# Patient Record
Sex: MALE | Race: WHITE | NOT HISPANIC OR LATINO | Employment: STUDENT | URBAN - METROPOLITAN AREA
[De-identification: names, ages, dates, MRNs, and addresses within clinical notes are randomized per-mention and may not be internally consistent; named-entity substitution may affect disease eponyms.]

---
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
## 2024-08-07 ENCOUNTER — DOCUMENTATION (OUTPATIENT)
Dept: PRIMARY CARE | Facility: CLINIC | Age: 19
End: 2024-08-07
Payer: COMMERCIAL

## 2024-08-07 NOTE — LETTER
August 7, 2024     Patient: Osmany Tapia   YOB: 2005   Date of Visit: 8/7/2024       To Whom It May Concern:    Osmany Tapia is a long-time patient in my family practice.  He is seen her for acute and chronic care.  He has a history of environmental allergies.  Please allow him to have an air conditioner in his dorm room to help with a more controlled environment.  THANK YOU!    If you have any questions or concerns, please don't hesitate to call.         Sincerely,         ACE Avila-CNP

## 2024-08-07 NOTE — PROGRESS NOTES
Mom at OV with brother. Pt is transferring to [employer] and needs a note to have an air conditioner in his dorm room due to allergies.

## 2025-05-20 ENCOUNTER — OFFICE VISIT (OUTPATIENT)
Dept: ORTHOPEDIC SURGERY | Facility: HOSPITAL | Age: 20
End: 2025-05-20
Payer: COMMERCIAL

## 2025-05-20 ENCOUNTER — HOSPITAL ENCOUNTER (OUTPATIENT)
Dept: RADIOLOGY | Facility: HOSPITAL | Age: 20
Discharge: HOME | End: 2025-05-20
Payer: COMMERCIAL

## 2025-05-20 DIAGNOSIS — S83.511A DISRUPTION OF ANTERIOR CRUCIATE LIGAMENT OF KNEE, RIGHT, INITIAL ENCOUNTER: Primary | ICD-10-CM

## 2025-05-20 DIAGNOSIS — M25.561 ACUTE PAIN OF RIGHT KNEE: ICD-10-CM

## 2025-05-20 PROCEDURE — 99214 OFFICE O/P EST MOD 30 MIN: CPT | Performed by: SPECIALIST/TECHNOLOGIST

## 2025-05-20 PROCEDURE — 73564 X-RAY EXAM KNEE 4 OR MORE: CPT | Mod: RIGHT SIDE | Performed by: RADIOLOGY

## 2025-05-20 PROCEDURE — 73564 X-RAY EXAM KNEE 4 OR MORE: CPT | Mod: RT

## 2025-05-20 PROCEDURE — 99204 OFFICE O/P NEW MOD 45 MIN: CPT | Performed by: SPECIALIST/TECHNOLOGIST

## 2025-05-20 RX ORDER — MELOXICAM 15 MG/1
15 TABLET ORAL DAILY
Qty: 14 TABLET | Refills: 0 | Status: SHIPPED | OUTPATIENT
Start: 2025-05-20 | End: 2025-06-03

## 2025-05-20 ASSESSMENT — PAIN - FUNCTIONAL ASSESSMENT: PAIN_FUNCTIONAL_ASSESSMENT: 0-10

## 2025-05-20 ASSESSMENT — PAIN SCALES - GENERAL: PAINLEVEL_OUTOF10: 5 - MODERATE PAIN

## 2025-05-20 NOTE — PROGRESS NOTES
"Chief Complaint: Pain of the Right Knee       HPI:  Osmany Tapia is a 19 y.o. male presenting, with his mother, to the orthopedic walk-in clinic with right knee pain.  He states he was playing in a men's soccer league on 5/18/2025 went up for a header and came down and landed funny.  He felt a \"crack\".  Today he reports an achy sensation throughout the entire knee.  Pain is worsened with knee flexion, getting in and out of the car and going downstairs.  He has not done anything for it since time of injury.  He denies prior right knee injuries.  Denies numbness or tingling into the right lower extremity.  Presents for treatment recommendations.    Objective     ROS:  Constitutional: No fever, no chills, not feeling tired, no recent weight gain and no recent weight loss  ENT: No nosebleeds  Cardiovascular: No chest pain  Respiratory: No shortness of breath and no cough  Gastrointestinal: No abdominal pain, no nausea, no diarrhea, and no vomiting  Musculoskeletal: Positive for right knee pain and swelling  Integumentary: No rashes and no skin lesions  Neurological: No headache  Psychiatric: No sleep disturbances no depression  Endocrine: No muscle weakness and no muscle cramps  Hematologic/lymphatic: No swelling glands and no tendency for easy bruising    Medical History[1]     Surgical History[2]     Social Connections: Not on file          Physical Exam:  General appearance: WN, WD male, in no acute distress  Skin: No rashes, lesions or wounds  Head: Normocephalic, no evidence of trauma  Eye: EOMI, conjunctiva clear, no discharge  ENT: Nares patent  Neck: No abnormal contour, tracheal midline  Chest/lungs: No respiratory distress, speaking in complete sentences  Musculoskeletal: Tenderness to palpation over the lateral joint line.  POSITIVE Lachman's.  Positive Doretha's.  Stable valgus and varus stress test at 0 and 30 degrees.  He is able to perform an isometric quadriceps contraction and straight leg raise " with minimal difficulties.  Knee flexion to 35 degrees secondary to pain.  Moderate effusion.  No decreased ROM, muscle wasting, rigidity    Neurological: A&O x3, no focal deficits, intact bilateral LE  Psych: normal affect, mood, appearance      Image Results:  X-rays taken on 5/20/2025 reviewed with the patient and his mother in the office and showed no acute fractures or dislocations.      Assessment/Plan   Encounter Diagnoses:  Acute pain of right knee    Orders Placed This Encounter    XR knee right 4+ views       The patient, his mother and I discussed his clinical presentation and physical exam findings consistent with a right knee anterior cruciate ligament disruption.  We discussed his conservative and surgical treatment options.  Ultimately, we agreed upon an MRI scan of the right knee to evaluate the integrity of the anterior cruciate ligament.    I feel the MRI is medically indicated and necessary based upon his mechanism of action (an acute landing with a valgus force and audible crack), positive physical exam findings (POSITIVE Lachman's, positive Doretha's), and decreased function of the right lower extremity.  Please have the MRI performed in the hospital setting so this can be easily viewed by the physician as this will be used for presurgical planning purposes.    In the meantime, we agreed upon initiating conservative treatment.  We agreed upon meloxicam 15 mg taken once daily with food for the next 14 days.  He was provided with a referral to physical therapy to focus on quadriceps, gluteus, core strength and stability, in office modalities, edema control, range of motion and home exercise program.  We will follow-up once the MRI is complete.  They are in agreement the plan.  Their questions are answered.    ** This office note was dictated using Dragon voice to text software and was not proofread for spelling or grammatical errors **         [1]   Past Medical History:  Diagnosis Date    Otitis  media, unspecified, left ear 11/16/2018    Acute left otitis media    Personal history of other (healed) physical injury and trauma 11/16/2018    History of trauma    Personal history of other infectious and parasitic diseases 01/23/2019    History of viral warts   [2] History reviewed. No pertinent surgical history.

## 2025-06-04 ENCOUNTER — HOSPITAL ENCOUNTER (OUTPATIENT)
Dept: RADIOLOGY | Facility: CLINIC | Age: 20
Discharge: HOME | End: 2025-06-04
Payer: COMMERCIAL

## 2025-06-04 ENCOUNTER — TELEPHONE (OUTPATIENT)
Dept: ORTHOPEDIC SURGERY | Facility: HOSPITAL | Age: 20
End: 2025-06-04
Payer: COMMERCIAL

## 2025-06-04 DIAGNOSIS — S83.511A DISRUPTION OF ANTERIOR CRUCIATE LIGAMENT OF KNEE, RIGHT, INITIAL ENCOUNTER: ICD-10-CM

## 2025-06-04 PROCEDURE — 73721 MRI JNT OF LWR EXTRE W/O DYE: CPT | Mod: RT

## 2025-06-04 NOTE — TELEPHONE ENCOUNTER
Spoke to the patient and his mother today regarding his MRI scan.  Unfortunately, as we suspected in the office, he did sustain an anterior cruciate ligament disruption and lateral meniscus tear.  He says that his knee feels a little bit better he is sleeping better but continues to have some difficulties.  We discussed the need for surgical intervention to her fix his anterior cruciate ligament and possibly repair his lateral meniscus versus doing a lateral meniscectomy.  Will have him see Dr. Geovani Hendricks for surgical consultation.  They are in agreement the plan.  Questions were answered.

## 2025-06-06 ENCOUNTER — OFFICE VISIT (OUTPATIENT)
Dept: ORTHOPEDIC SURGERY | Facility: HOSPITAL | Age: 20
End: 2025-06-06
Payer: COMMERCIAL

## 2025-06-06 VITALS — WEIGHT: 185 LBS | HEIGHT: 70 IN | BODY MASS INDEX: 26.48 KG/M2

## 2025-06-06 DIAGNOSIS — S83.511A DISRUPTION OF ANTERIOR CRUCIATE LIGAMENT OF KNEE, RIGHT, INITIAL ENCOUNTER: ICD-10-CM

## 2025-06-06 DIAGNOSIS — S83.271A COMPLEX TEAR OF LATERAL MENISCUS OF RIGHT KNEE, INITIAL ENCOUNTER: ICD-10-CM

## 2025-06-06 DIAGNOSIS — S83.231A COMPLEX TEAR OF MEDIAL MENISCUS OF RIGHT KNEE, INITIAL ENCOUNTER: ICD-10-CM

## 2025-06-06 PROCEDURE — 99214 OFFICE O/P EST MOD 30 MIN: CPT | Performed by: ORTHOPAEDIC SURGERY

## 2025-06-06 PROCEDURE — 3008F BODY MASS INDEX DOCD: CPT | Performed by: ORTHOPAEDIC SURGERY

## 2025-06-06 ASSESSMENT — PAIN SCALES - GENERAL: PAINLEVEL_OUTOF10: 8

## 2025-06-06 ASSESSMENT — PAIN - FUNCTIONAL ASSESSMENT: PAIN_FUNCTIONAL_ASSESSMENT: 0-10

## 2025-06-06 NOTE — LETTER
June 6, 2025     Patient: Osmany Tapia   YOB: 2005   Date of Visit: 6/6/2025       To Whom It May Concern:    It is my medical opinion that Osmany Tapia {Work release (duty restriction):02879}.    If you have any questions or concerns, please don't hesitate to call.         Sincerely,        Geovani Gaines MD    CC: No Recipients

## 2025-06-06 NOTE — PROGRESS NOTES
Chief Complaint  Right knee injury    History of Present Illness   Osmany Tapia is 19 y.o. male presenting for a new patient evaluation of an acute right knee injury while playing soccer.  He was initially seen by Luis in the injury clinic.  The injury occurred a little over a week ago. The patient experienced an acute shift in the knee with immediate pain, swelling and difficulty bearing weight. The knee continues to feel unstable.  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  Patient is accompanied by parents.  No prior injury to the knee in the past.   Contralateral knee is asymptomatic.   Desires to remain active in sports, specifically soccer  Physical therapy has not been initiated.  MRI has been performed.     Medical History[1]    Medication Documentation Review Audit       Reviewed by Geovani Gaines MD (Physician) on 25 at 1054      Medication Order Taking? Sig Documenting Provider Last Dose Status   EPINEPHrine 0.3 mg/0.3 mL injection syringe [id number]  Inject 0.3 mL (0.3 mg) as directed 1 time. Historical Provider, MD  Active   meloxicam (Mobic) 15 mg tablet [id number]  Take 1 tablet (15 mg) by mouth once daily for 14 days. Jluis Wills PA-C   [id number]                    RX Allergies[2]    Social History     Socioeconomic History    Marital status: Single     Spouse name: Not on file    Number of children: Not on file    Years of education: Not on file    Highest education level: Not on file   Occupational History    Not on file   Tobacco Use    Smoking status: Never    Smokeless tobacco: Not on file   Substance and Sexual Activity    Alcohol use: Not on file    Drug use: Not on file    Sexual activity: Not on file   Other Topics Concern    Not on file   Social History Narrative    Not on file     Social Drivers of Health     Financial Resource Strain: Not on file   Food Insecurity: Not on file   Transportation Needs: Not on file   Physical Activity: Not on file   Stress: Not  on file   Social Connections: Not on file   Intimate Partner Violence: Not on file   Housing Stability: Not on file       Surgical History[3]     Review of Systems   GENERAL: Negative for malaise, significant weight loss, fever  MUSCULOSKELETAL: See HPI  NEURO:  Negative for numbness / tingling      Physical Exam  19 y.o. year-old male antalgic in no acute distress. Well nourished. Normal affect. Alert and oriented x 3.   Gait: Normal Tandem. Neutral alignment. Able to perform single leg stance. No abnormalities of balance or coordination.  Skin: Intact over the bilateral upper and lower extremities. No erythema, ecchymosis, or temperature changes.  Negative bilateral popliteal lymphadenopathy.  Hips: Painless ROM in all planes bilaterally. No tenderness to palpation.    Right Knee:  ROM: 5-11 positive 0 degrees. Negative crepitus.  And anterior drawer positive with guarded effusion.  Good quadriceps contraction. Intact extensor mechanism. Patellar tendon non-tender.  Patella facets non-tender. Negative apprehension. Negative tilt.   Lachman stable. Anterior drawer stable. Pivot negative. Posterior drawer negative.  Stable medial collateral ligament. Stable lateral collateral ligament.   Positive medial joint line tenderness.  Positive Doretha's.  Positive lateral joint line tenderness.  Positive Doretha's.       Left Knee:  ROM: 0-140 degrees. Negative crepitus.  No effusion.  Good quadriceps contraction. Intact extensor mechanism. Patellar tendon non-tender.  Patella facets non-tender. Negative apprehension. Negative tilt.   Lachman stable. Anterior drawer stable. Pivot negative. Posterior drawer negative.  Stable medial collateral ligament. Stable lateral collateral ligament.   Negative medial joint line tenderness.  Negative Doretha's.  Negative lateral joint line tenderness.  Negative Doretha's.       Motor Strength: 5 out of 5 in the bilateral lower extremities.  Neuro: L4-S1 sensation intact grossly  bilaterally. No clonus. 2+ and symmetric Patella and Achilles bilateral reflexes.  Vascular: 2+ DP/PT pulses bilaterally. Bilateral lower extremity compartments supple.     Imaging  MRI of the right knee is consistent with complete rupture of the ACL with translational bone bruises and effusion.  Positive tearing of the medial and lateral meniscus.  Articular cartilage grossly maintained.  Mild MCL sprain.  Growth plates closed.     Assessment:    Right knee injury consistent with acute anterior cruciate ligament rupture, medial and lateral meniscus tears     Plan:  The patient and I reviewed the findings of an ACL rupture with meniscus pathology. There is continued knee instability and pain. Conservative treatment has failed to provide an acceptable clinical outcome. Based on the patients activity level, persistent instability, ACL laxity on exam, and MRI findings surgery is indicated. We discussed maximizing PT and motion before surgery. We reviewed logistics regarding school and return to soccer.    Risks of knee arthroscopy were discussed with the patient at length. These include but are not limited to: Cardiovascular compromise, anesthetic complications infection, bleeding, neurovascular injury, blood clots, persistent pain, and stiffness.  The postoperative course regarding the use of medications, crutches, brace, care for the incision, and physical therapy were also outlined. We discussed logistics with regards to driving, work/school, and appropriate activity restrictions in the early post operative period. In most cases it takes 9 months to 1 year to achieve maximum recovery. The patient voices understanding of these risks and postoperative course.    The meniscus will be evaluated at the time of arthroscopy. Arthroscopic partial meniscectomy versus repair may be indicated based on the status of meniscus stability. If a repair is performed, up to one month on crutches may be required. There is a small risk  of recurrent meniscus tearing or failure of the meniscus to fully heal. Early physical therapy will limit flexion to 90° for the first month if a repair is performed in order to protect it sufficiently.    Complications specific to ACL reconstruction surgery include: loss of terminal knee flexion or extension, recurrent ligament rupture, implant related complications, development of knee adhesions, potential for additional surgery on the knee in the future, progression of knee degenerative chondral changes, and rupture of the contralateral native ACL. A small percentage of patients report an inability to return to their pre injury level of athletics.  We discussed the risks of recurrent rupture and contralateral knee injury are higher in younger patients.     A post operative hinged ACL brace is prescribed by me for post operative stabilization of the leg until quadriceps muscle strength returns and for protection of the ligament reconstruction. Diagnosis is right anterior cruciate ligament tear.    We discussed ACL graft reconstruction options. After an informed discussion, the patient and I elected to utilize bone-patellar tendon-bone autograft. We discussed the good clinical results and strength of the graft with this option. There is a potential for anterior knee pain, patellar tendinitis, patella fracture and focal area of numbness around the incision.        [1]   Past Medical History:  Diagnosis Date    Otitis media, unspecified, left ear 11/16/2018    Acute left otitis media    Personal history of other (healed) physical injury and trauma 11/16/2018    History of trauma    Personal history of other infectious and parasitic diseases 01/23/2019    History of viral warts   [2]   Allergies  Allergen Reactions    Amoxicillin Unknown    Peanut Unknown   [3] History reviewed. No pertinent surgical history.

## 2025-06-12 ENCOUNTER — EVALUATION (OUTPATIENT)
Dept: PHYSICAL THERAPY | Facility: CLINIC | Age: 20
End: 2025-06-12
Payer: COMMERCIAL

## 2025-06-12 DIAGNOSIS — R29.898 WEAKNESS OF RIGHT LOWER EXTREMITY: ICD-10-CM

## 2025-06-12 DIAGNOSIS — S83.511D DISRUPTION OF ANTERIOR CRUCIATE LIGAMENT OF KNEE, RIGHT, SUBSEQUENT ENCOUNTER: ICD-10-CM

## 2025-06-12 DIAGNOSIS — S83.231D COMPLEX TEAR OF MEDIAL MENISCUS OF RIGHT KNEE, SUBSEQUENT ENCOUNTER: ICD-10-CM

## 2025-06-12 DIAGNOSIS — M25.661 DECREASED RANGE OF MOTION (ROM) OF RIGHT KNEE: Primary | ICD-10-CM

## 2025-06-12 PROCEDURE — 97110 THERAPEUTIC EXERCISES: CPT | Mod: GP

## 2025-06-12 PROCEDURE — 97161 PT EVAL LOW COMPLEX 20 MIN: CPT | Mod: GP

## 2025-06-12 ASSESSMENT — ENCOUNTER SYMPTOMS
DEPRESSION: 0
LOSS OF SENSATION IN FEET: 0
OCCASIONAL FEELINGS OF UNSTEADINESS: 0

## 2025-06-12 ASSESSMENT — PAIN - FUNCTIONAL ASSESSMENT: PAIN_FUNCTIONAL_ASSESSMENT: 0-10

## 2025-06-12 ASSESSMENT — PAIN SCALES - GENERAL: PAINLEVEL_OUTOF10: 5 - MODERATE PAIN

## 2025-06-12 NOTE — PROGRESS NOTES
Physical Therapy    Physical Therapy Evaluation    Patient Name: Osmany Tapia  MRN: [id number]  Today's Date: 6/12/2025  Name and date of birth 2005 were confirmed at the start of today's session.     Time Entry:  Time Calculation  Start Time: 0907  Stop Time: 1000  Time Calculation (min): 53 min  PT Evaluation Time Entry  PT Evaluation (Low) Time Entry: 23  PT Therapeutic Procedures Time Entry  Therapeutic Exercise Time Entry: 30                   Assessment  PT Assessment Results: Decreased strength, Decreased range of motion, Decreased mobility, Pain  Rehab Prognosis: Excellent  Assessment Comment: Osmany is 3 1/2 weeks post ACL tear during a soccer game and presents with expected pain, ROM , strength and functional deficits. He has been referred to complete prehab prior to his scheduled ACL reconstruction on 7/8/ 25.  He will be seen for up to 2 visits to focus on extensive HEP instruction/ monitoring to work to maximize ROM, mm function, gait mechanics.  We are going to limit preop visits due to only having 20 total PT visits to use for the calendar year and needing to save visits for the extensive post op rehab.    Plan  Treatment/Interventions: Neuromuscular re-education, Therapeutic exercises, Gait training, Education/ Instruction, Other (comment) (Focus on HEP for prehab to prepare for upcoming ACL reconstruction)  PT Plan: Skilled PT  PT Frequency: Follow-up visit only (1 additional visit prior to surgery)  Duration: 3 weeks  Onset Date: 05/18/25  Number of Treatments Authorized: no prior auth required;   20 visit hard max per calendar year  Rehab Potential: Excellent  Plan of Care Agreement: Patient    Current Problem  1. Decreased range of motion (ROM) of right knee  Referral to Physical Therapy    Follow Up In Physical Therapy      2. Disruption of anterior cruciate ligament of knee, right, subsequent encounter  Referral to Physical Therapy    Follow Up In Physical Therapy      3. Complex tear  of medial meniscus of right knee, subsequent encounter  Referral to Physical Therapy    Follow Up In Physical Therapy      4. Weakness of right lower extremity  Follow Up In Physical Therapy          Subjective   General:  General  Reason for Referral: R knee ACL tear; prehab for scheduled surgery on 7/8/25  Referred By: Dr Geovani Gaines  Past Medical History Relevant to Rehab: Reviewed in Epic and on Rehab intake form  General Comment: Osmany injured his right knee during a soccer game in a men's competitive league on May 18, 2025.  He has had an MRI which confirmed the ACL was torn and the lateral meniscus is torn.  Precautions:  Precautions  STEADI Fall Risk Score (The score of 4 or more indicates an increased risk of falling): 0  Medical Precautions:  (Asthma, Tree nut allergy)  Precautions Comment: none    Pain:  Pain Assessment: 0-10  0-10 (Numeric) Pain Score: 5 - Moderate pain (worse with some movement)  Pain Frequency: Constant/continuous  Pain Interventions:  (OTC meds currently prn, ice if needed)  Home Living:  Home Living Comment: Two story home, lives with parents, taking steps one at a time  Prior Function Per Pt/Caregiver Report:  Level of [place]:  (INdependent)  Vocational:  (College student, 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     Knee AROM  R knee flexion: (140°): 118 deg  L knee flexion: (140°): 140 deg  R knee extension: (0°): -4 deg  L knee extension: (0°): 0 deg    Passive right knee ext to 0 deg    Knee MMT     Lower Extremity Strength Right Left  5/5 throughout   Hip Flexors 3+  Slight ext lag    Hip Abductors 4+    Hip Extensors 4    Hip Adductors 4-    Quads 3+  Poor to fair quad set with reps    Hamstrings 4-    Gastroc/Soleus 4    Ankle Dorsiflexors 5    Ankle  invertors 5    Ankle Evertors 5                    Special Tests  Lachman’s: (Negative): positive right  Anterior Drawer: (Negative): positive right  Gait  Gait Comment: Ambulating FWB with limp on right; extension lag during swing/ heel strike and stance on right. Step to pattern on stairs.  Flexibility  R hamstrings: mod tight  R quads: mod tight    Outcome Measures:  Other Measures  Lower Extremity Funtional Score (LEFS): 38 (Presurgery)     OP EDUCATION:  Outpatient Education  Individual(s) Educated: Patient  Education Provided: Home Exercise Program, POC  Risk and Benefits Discussed with Patient/Caregiver/Other: yes  Patient/Caregiver Demonstrated Understanding: yes  Plan of Care Discussed and Agreed Upon: yes  Patient Response to Education: Patient/Caregiver Verbalized Understanding of Information, Patient/Caregiver Performed Return Demonstration of Exercises/Activities, Patient/Caregiver Asked Appropriate Questions  Education Comment: HEP handout issued; instructed on access to HEP through [employer]  Access Code: [email or web address]  URL: [email or web address]  Date: 06/12/2025  Prepared by: Zora Gabriel    Exercises  - Supine Heel Slide  - 2-3 x daily - 7 x weekly - 20 reps  - Supine Knee Extension Stretch on Towel Roll  - 2-3 x daily - 7 x weekly - 1 reps - 5 min hold  - Long Sitting Quad Set with Towel Roll Under Heel (Mirrored)  - 2-3 x daily - 7 x weekly - 20 reps - 5 sec hold  - Active Straight Leg Raise with Quad Set  - 2-3 x daily - 7 x weekly - 3 sets - 10 reps  - Long Sitting Hamstring Set (Mirrored)  - 2-3 x daily - 7 x weekly - 20 reps - 5 sec hold  - Sidelying Hip Abduction  - 2-3 x daily - 7 x weekly - 3 sets - 10 reps  - Prone Terminal Knee Extension  - 2-3 x daily - 7 x weekly - 3 sets - 10 reps  - Prone Hip Extension  - 2-3 x daily - 7 x weekly - 3 sets - 10 reps  - Prone Knee Flexion Extension AROM  - 2-3 x daily - 7 x weekly - 3 sets - 10 reps  - Seated Hip Adduction  Isometrics with Ball  - 2-3 x daily - 7 x weekly - 3 sets - 10 reps      Today's Treatment:  30 min there ex: (prehab ex)  Supine passive knee ext with heel propped x 3 min (more for home)  Supine Heel slides:   x 20  Quad sets:  20 x 5 sec  Hamstring sets: 15 x 5 sec  Supine hip flexion: 2 x 10  Sidelying hip abd:  3 x 10  Prone knee ext with pillow under thigh:  x 5 min  Prone Hamstring curls:  3 x 10  Prone hip ext:  2 x 10  Prone TKE  15 x 5 sec  HEP handout issued    Goals: (pre surgery)  Patient Goal:  To be ready for ACL surgery in 3 1/2 weeks; and eventually be able to return to playing soccer competitively.    Physical Therapy Goals: (to be met prior to scheduled surgery on 7/8/25)  Symptom Management: Will be compliant with use of ice, elevation, brace use to control for edema, pain, instability with daily activities.  2.. ROM: Right knee AROM improved to at least 0-130 to prepare for ACL surgery and improve ease of achieving full post op ROM.  3.. Strength: Right hip strength at least 4+/5;  completes 20-30 SLRs without ext lag; demonstrates good quad set for normalized gait mechanics and to improve mm function to prepare for post op rehab.   4.. Gait: Ambulating FWB without limp or ext lag, with normal heel/ toe pattern and using reciprocal gait pattern ascending and descending stairs.   5. HEP: Will be independent and compliant with appropriate HEP for carryover of PT to meet all goals prior to ACL reconstruction surgery on 7/8/25.

## 2025-06-18 ENCOUNTER — PREP FOR PROCEDURE (OUTPATIENT)
Dept: ORTHOPEDIC SURGERY | Facility: HOSPITAL | Age: 20
End: 2025-06-18
Payer: COMMERCIAL

## 2025-06-18 DIAGNOSIS — S83.511D COMPLETE TEAR OF ANTERIOR CRUCIATE LIGAMENT OF KNEE, RIGHT, SUBSEQUENT ENCOUNTER: ICD-10-CM

## 2025-06-18 DIAGNOSIS — S83.231D COMPLEX TEAR OF MEDIAL MENISCUS OF RIGHT KNEE, SUBSEQUENT ENCOUNTER: ICD-10-CM

## 2025-06-18 DIAGNOSIS — S83.271D COMPLEX TEAR OF LATERAL MENISCUS OF RIGHT KNEE, SUBSEQUENT ENCOUNTER: ICD-10-CM

## 2025-06-19 ENCOUNTER — CLINICAL SUPPORT (OUTPATIENT)
Dept: PREADMISSION TESTING | Facility: HOSPITAL | Age: 20
End: 2025-06-19
Payer: COMMERCIAL

## 2025-06-19 RX ORDER — IBUPROFEN 200 MG
200 TABLET ORAL EVERY 8 HOURS PRN
COMMUNITY

## 2025-06-19 ASSESSMENT — ENCOUNTER SYMPTOMS
SHORTNESS OF BREATH: 0
ARTHRALGIAS: 1
SINUS CONGESTION: 0
EYE PAIN: 0
COUGH: 0
DIFFICULTY URINATING: 0
DIARRHEA: 0
EYE DISCHARGE: 0
CONFUSION: 0
RHINORRHEA: 0
NECK PAIN: 0
VOMITING: 0
AGITATION: 0
CHILLS: 0
NERVOUS/ANXIOUS: 0
ABDOMINAL PAIN: 0
NECK SWELLING: 0
NAUSEA: 0
FEVER: 0
WOUND: 0

## 2025-06-19 NOTE — CPM/PAT NURSE NOTE
CPM/PAT Nurse Note      Name: Osmany Tapia (Osmany Tapia)  /Age: 10/9// y.o.       Medical History[1]    Surgical History[2]    Patient Sexual activity questions deferred to the physician.    Family History[3]    Allergies[4]    Prior to Admission medications    Medication Sig Start Date End Date Taking? Authorizing Provider   ibuprofen 200 mg tablet Take 1 tablet (200 mg) by mouth every 8 hours if needed for mild pain (1 - 3) or moderate pain (4 - 6).   Yes Historical Provider, MD   EPINEPHrine 0.3 mg/0.3 mL injection syringe Inject 0.3 mL (0.3 mg) as directed 1 time. 17   Historical Provider, MD MADRID ROS:   Constitutional:    no fever   no chills  Neuro/Psych:    no agitation   no confusion   not nervous/anxious  Eyes:    no discharge   no pain   no corrective lenses  Ears:    no ear pain   no ear discharge   no hearing aides  Nose:    no nasal discharge   no sinus congestion   no epistaxis  Mouth:   Throat:    no throat pain  Neck:    no neck pain   neck swelling  Cardio:    no chest pain  Respiratory:    no cough   no shortness of breath  Endocrine:   GI:    no abdominal pain   no diarrhea   no nausea   no vomiting  :    no difficulty urinating  Musculoskeletal:    arthralgias (right knee)  Hematologic:    no history of blood transfusion   no blood clots  Skin:   no sores/wound   no rash      DASI Risk Score    No data to display       Caprini DVT Assessment    No data to display       Modified Frailty Index    No data to display       [id number] Stroke Risk Points  Current as of just now        N/A 0 to 9 Points:      Last Change: N/A          The [id number] risk score (Lip MICHEAL, et al. 2009. © 2010 [employer]) quantifies the risk of stroke for a patient with atrial fibrillation. For patients without atrial fibrillation or under the age of 18 this score appears as N/A. Higher score values generally indicate higher risk of stroke.        This score is  not applicable to this patient. Components are not calculated.          Revised Cardiac Risk Index    No data to display       Apfel Simplified Score    No data to display       Risk Analysis Index Results This Encounter    No data found in the last 10 encounters.       Stop Bang Score      Flowsheet Row Clinical Support from 6/19/2025 in [facility]   Do you snore loudly? 0 filed at 06/19/2025 1307   Do you often feel tired or fatigued after your sleep? 0 filed at 06/19/2025 1307   Has anyone ever observed you stop breathing in your sleep? 0 filed at 06/19/2025 1307   Do you have or are you being treated for high blood pressure? 0 filed at 06/19/2025 1307   Recent BMI (Calculated) 26.5 filed at 06/19/2025 1307   Is BMI greater than 35 kg/m2? 0=No filed at 06/19/2025 1307   Age older than 50 years old? 0=No filed at 06/19/2025 1307   Gender - Male 1=Yes filed at 06/19/2025 1307          Prodigy: High Risk  Total Score: 8              Prodigy Gender Score          ARISCAT Score for Postoperative Pulmonary Complications    No data to display       Land Perioperative Risk for Myocardial Infarction or Cardiac Arrest (PRINCESS)    No data to display         Nurse Plan of Action:                [1]   Past Medical History:  Diagnosis Date    Asthma     childhood    Joint pain     Otitis media, unspecified, left ear 11/16/2018    Acute left otitis media    Personal history of other (healed) physical injury and trauma 11/16/2018    History of trauma    Personal history of other infectious and parasitic diseases 01/23/2019    History of viral warts   [2] History reviewed. No pertinent surgical history.  [3]   Family History  Problem Relation Name Age of Onset    Thyroid disease Father      Cancer Paternal Grandfather          stomach    Other (MALIGNANT NEOPLASM) Paternal Grandfather      Thyroid disease Paternal Grandfather     [4]   Allergies  Allergen Reactions    Amoxicillin Unknown    Peanut Itching and  Other     Eyelid swelling, back muscles tighten

## 2025-06-19 NOTE — PERIOPERATIVE NURSING NOTE
PAT PRE-OPERATIVE INSTRUCTIONS    [facility]  [address] [place] [address]  [phone]    Please let your surgeon know if:      You develop:  Open sores, shingles, burning or painful urination as these may increase your risk of an infection.   Fever=100.4 or greater   New or worsening cold or flu symptoms ( cough, shortness of breath, sore throat, respiratory distress, headache, fatigue, GI symptoms)   You no longer wish to have the surgery.   Any other personal circumstances change that may lead to the need to cancel or defer this surgery-such as being sick or getting admitted to any hospital within one week of your planned procedure.    Your contact details change, such as a change of address or phone number.    Starting now:     Please DO NOT drink alcohol or smoke for 24 hours before surgery. It is well known that quitting smoking can make a huge difference to your health and recovery from surgery. The longer you abstain from smoking, the better your chances of a healthy recovery. If you need help with quitting, call 1-800-QUIT-NOW to be connected to a trained counselor who will discuss the best methods to help you quit.     Before your surgery:    Please stop all supplements/ vitamins 7 days prior to surgery (or as directed by your surgeon).   Please stop taking NSAID pain medicine such as Advil, Ibuprofen, and Motrin 7 days before surgery.    If you develop any fever, cough, cold, rashes, cuts, scratches, scrapes, urinary symptoms or infection anywhere on your body (including teeth and gums) prior to surgery, please call your surgeon’s office as soon as possible. This may require treatment to reduce the chance of cancellation on the day of surgery.    The day before your surgery:   DIET- Do not eat any food after MIDNIGHT.   Get a good night’s rest.  Use the special soap for bathing if you have been instructed to use one.    Scheduled surgery times may change  and you will be notified if this occurs - please check your personal voicemail for any updates.     On the morning of surgery:   Wear comfortable, loose fitting clothes which open in the front.   Shower and please do not wear moisturizers, creams, lotions, deodorants, makeup or perfume.    Please bring with you to surgery:   Photo ID and insurance card   Current list of medicines and allergies   Pacemaker/ Defibrillator/Heart stent cards as well as remote controls for implanted devices    CPAP machine and mask    Slings/ splints/ crutches   A copy of your complete advanced directive/DHPOA.    Please do NOT bring with you to surgery:   All jewelry and valuables should be left at home.   Prosthetic devices such as contact lenses, glasses, hearing aids, dentures, eyelash extensions, hairpins and body piercings must be removed prior to going in to the surgical suite. If you have a case for these items, please bring it with you on surgery day.    *Patients under the age of 18: A responsible adult must be present and remaining in the building throughout the surgical visit.    After outpatient surgery:   A responsible adult MUST accompany you at the time of discharge and stay with you for 24 hours after your surgery. You may NOT drive yourself home after surgery.    Do not drive, operate machinery, make critical decisions or do activities that require co-ordination or balance until after a night’s sleep.   Do not drink alcoholic beverages for 24 hours.   Instructions for resuming your medications will be provided by your surgeon.    CALL YOUR DOCTOR AFTER SURGERY IF YOU HAVE:     Chills and/or a fever of 101° F or higher.    Redness, swelling, pus or drainage from your surgical wound or a bad smell from the wound.    Lightheadedness, fainting or confusion.    Persistent vomiting (throwing up) and are not able to eat or drink for 12 hours.    Three or more loose, watery bowel movements in 24 hours (diarrhea).   Difficulty  or pain while urinating( after non-urological surgery)    Pain and swelling in your legs, especially if it is only on one side.    Difficulty breathing or are breathing faster than normal.    Any new concerning symptoms.      Reviewed pre-op instructions with patient, states understanding and denies further questions at this time.      Take Care Osmany!

## 2025-06-26 ENCOUNTER — TREATMENT (OUTPATIENT)
Dept: PHYSICAL THERAPY | Facility: CLINIC | Age: 20
End: 2025-06-26
Payer: COMMERCIAL

## 2025-06-26 DIAGNOSIS — S83.511D DISRUPTION OF ANTERIOR CRUCIATE LIGAMENT OF KNEE, RIGHT, SUBSEQUENT ENCOUNTER: ICD-10-CM

## 2025-06-26 DIAGNOSIS — S83.231D COMPLEX TEAR OF MEDIAL MENISCUS OF RIGHT KNEE, SUBSEQUENT ENCOUNTER: ICD-10-CM

## 2025-06-26 DIAGNOSIS — R29.898 WEAKNESS OF RIGHT LOWER EXTREMITY: ICD-10-CM

## 2025-06-26 DIAGNOSIS — M25.661 DECREASED RANGE OF MOTION (ROM) OF RIGHT KNEE: ICD-10-CM

## 2025-06-26 PROCEDURE — 97110 THERAPEUTIC EXERCISES: CPT | Mod: GP

## 2025-06-26 ASSESSMENT — PAIN SCALES - GENERAL: PAINLEVEL_OUTOF10: 0 - NO PAIN

## 2025-06-26 ASSESSMENT — PAIN - FUNCTIONAL ASSESSMENT: PAIN_FUNCTIONAL_ASSESSMENT: 0-10

## 2025-06-26 NOTE — PROGRESS NOTES
Physical Therapy    Physical Therapy Treatment/ Pre op discharge    Patient Name: Osmany Tapia  MRN: [id number]  Today's Date: 6/26/2025    Time Entry:   Time Calculation  Start Time: 1200  Stop Time: 1240  Time Calculation (min): 40 min     PT Therapeutic Procedures Time Entry  Therapeutic Exercise Time Entry: 40                   Assessment:   Pre surgery goals have been met.  Osmany's right knee AROM, quad function, hip strength, gait mechanics are WNL.  He is independent with his HEP for continued strengthening and ROM maintenance until his scheduled ACL reconstruction surgery on 7/8/25. No additional pre op visits are scheduled or indicated.    Plan:   Osmany will undergo ACL reconstruction surgery on 7/8/25.  He will begin post op rehab shortly thereafter. He is deciding if he will continue here or go to [facility] and will get that scheduled.     Current Problem  1. Weakness of right lower extremity  Follow Up In Physical Therapy      2. Disruption of anterior cruciate ligament of knee, right, subsequent encounter  Follow Up In Physical Therapy      3. Complex tear of medial meniscus of right knee, subsequent encounter  Follow Up In Physical Therapy      4. Decreased range of motion (ROM) of right knee  Follow Up In Physical Therapy          General     General  General Comment: Doing well. Has been doing his HEP and has less pain, improved knee motion, improved strength. Is walking better and doing steps better at home.    Subjective    Precautions  Precautions  STEADI Fall Risk Score (The score of 4 or more indicates an increased risk of falling): 0  Precautions Comment: none    Pain  Pain Assessment  Pain Assessment: 0-10  0-10 (Numeric) Pain Score: 0 - No pain (with current daily activities)  Pain Type:  (subacute)  Pain Location: Knee  Pain Orientation: Right    Objective   Knee AROM  Right knee :   0-140 deg painfree    Knee MMT     Lower Extremity Strength Right  (Pre op eval) Right  preop  Follow  up visit   Hip Flexors 3+  Slight ext lag 4+  30 SLRs , No ext lag   Hip Abductors 4+ 5   Hip Extensors 4 4+   Hip Adductors 4- 4   Quads 3+  Poor to fair quad set with reps 4   Hamstrings 4- 4   Gastroc/Soleus 4 4+   Ankle Dorsiflexors 5    Ankle invertors 5    Ankle Evertors 5                 Gait:  Ambulating level surfaces without a limp or ext katya at this time.  Reciprocal pattern on stairs.       Patient Education:   Access Code: [email or web address]  URL: [email or web address]  Date: 06/26/2025  Prepared by: Zora Gabriel    Exercises  - Supine Bridge  - 1 x daily - 7 x weekly - 15 reps - 5 sec hold  - Hip Abduction with Resistance Loop  - 1 x daily - 7 x weekly - 1-2 sets - 20 reps  - Hip Extension with Resistance Loop  - 1 x daily - 7 x weekly - 1-2 sets - 20 reps  - Standing Diagonal Hip Extension and External Rotation (Mirrored)  - 1 x daily - 7 x weekly - 1-2 sets - 20 reps  - Side Stepping with Resistance at Ankles  - 1 x daily - 7 x weekly  - Forward Step Up with Counter Support (Mirrored)  - 1 x daily - 7 x weekly - 20 reps    Today's Treatment: 40 min  Measurements of ROM / strength for improvement since preop eval (see objective data):  Stationary Bike:  8 min (SHERPANDIPITYdyne)  Supine SLR flexion 2 x 15  Tband hip ex:  Blue band above knees x 20 ea:   Abduction R/L   Extension R/L   ER (right)  Sidestepping with band above knees:  6 x 20 ft  Forward step up: 8in x 20  Bridging: 15 x 5 sec  Updated preop HEP with above.     Goals: (pre surgery)- as of 6/26/25  Patient Goal:  To be ready for ACL surgery in 3 1/2 weeks; and eventually be able to return to playing soccer competitively.    Physical Therapy Goals: (to be met prior to scheduled surgery on 7/8/25)  Symptom Management: Will be compliant with use of ice, elevation, brace use to control for edema, pain, instability with daily activities.met  2.. ROM: Right knee AROM improved to at least 0-130 to prepare for ACL surgery and improve ease  of achieving full post op ROM.- met (0-140 deg currently  3.. Strength: Right hip strength at least 4+/5;  completes 20-30 SLRs without ext lag; demonstrates good quad set for normalized gait mechanics and to improve mm function to prepare for post op rehab. - met  4.. Gait: Ambulating FWB without limp or ext lag, with normal heel/ toe pattern and using reciprocal gait pattern ascending and descending stairs. - met  5. HEP: Will be independent and compliant with appropriate HEP for carryover of PT to meet all goals prior to ACL reconstruction surgery on 7/8/25. - met

## 2025-07-08 ENCOUNTER — ANESTHESIA (OUTPATIENT)
Dept: OPERATING ROOM | Facility: HOSPITAL | Age: 20
End: 2025-07-08
Payer: COMMERCIAL

## 2025-07-08 ENCOUNTER — ANESTHESIA EVENT (OUTPATIENT)
Dept: OPERATING ROOM | Facility: HOSPITAL | Age: 20
End: 2025-07-08
Payer: COMMERCIAL

## 2025-07-08 ENCOUNTER — HOSPITAL ENCOUNTER (OUTPATIENT)
Facility: HOSPITAL | Age: 20
Setting detail: OUTPATIENT SURGERY
Discharge: HOME | End: 2025-07-08
Attending: ORTHOPAEDIC SURGERY | Admitting: ORTHOPAEDIC SURGERY
Payer: COMMERCIAL

## 2025-07-08 VITALS
TEMPERATURE: 98.8 F | SYSTOLIC BLOOD PRESSURE: 137 MMHG | HEART RATE: 88 BPM | DIASTOLIC BLOOD PRESSURE: 84 MMHG | OXYGEN SATURATION: 99 % | WEIGHT: 191.8 LBS | HEIGHT: 70 IN | BODY MASS INDEX: 27.46 KG/M2 | RESPIRATION RATE: 15 BRPM

## 2025-07-08 DIAGNOSIS — S83.511D COMPLETE TEAR OF ANTERIOR CRUCIATE LIGAMENT OF KNEE, RIGHT, SUBSEQUENT ENCOUNTER: ICD-10-CM

## 2025-07-08 DIAGNOSIS — S83.231D COMPLEX TEAR OF MEDIAL MENISCUS OF RIGHT KNEE, SUBSEQUENT ENCOUNTER: ICD-10-CM

## 2025-07-08 DIAGNOSIS — S83.271D COMPLEX TEAR OF LATERAL MENISCUS OF RIGHT KNEE, SUBSEQUENT ENCOUNTER: Primary | ICD-10-CM

## 2025-07-08 PROCEDURE — 2500000004 HC RX 250 GENERAL PHARMACY W/ HCPCS (ALT 636 FOR OP/ED): Performed by: PHYSICIAN ASSISTANT

## 2025-07-08 PROCEDURE — 2780000003 HC OR 278 NO HCPCS: Performed by: ORTHOPAEDIC SURGERY

## 2025-07-08 PROCEDURE — 3600000009 HC OR TIME - EACH INCREMENTAL 1 MINUTE - PROCEDURE LEVEL FOUR: Performed by: ORTHOPAEDIC SURGERY

## 2025-07-08 PROCEDURE — 64999 UNLISTED PX NERVOUS SYSTEM: CPT | Performed by: ANESTHESIOLOGY

## 2025-07-08 PROCEDURE — 7100000009 HC PHASE TWO TIME - INITIAL BASE CHARGE: Performed by: ORTHOPAEDIC SURGERY

## 2025-07-08 PROCEDURE — 2500000004 HC RX 250 GENERAL PHARMACY W/ HCPCS (ALT 636 FOR OP/ED): Performed by: ANESTHESIOLOGY

## 2025-07-08 PROCEDURE — 3600000004 HC OR TIME - INITIAL BASE CHARGE - PROCEDURE LEVEL FOUR: Performed by: ORTHOPAEDIC SURGERY

## 2025-07-08 PROCEDURE — 7100000001 HC RECOVERY ROOM TIME - INITIAL BASE CHARGE: Performed by: ORTHOPAEDIC SURGERY

## 2025-07-08 PROCEDURE — 64473 LWR XTR FSCL PLN BLK UNI NJX: CPT | Mod: XP,RT

## 2025-07-08 PROCEDURE — 2500000005 HC RX 250 GENERAL PHARMACY W/O HCPCS: Performed by: ORTHOPAEDIC SURGERY

## 2025-07-08 PROCEDURE — A29888 PR KNEE SCOPE,AID ANT CRUCIATE REPAIR: Performed by: NURSE ANESTHETIST, CERTIFIED REGISTERED

## 2025-07-08 PROCEDURE — 2500000004 HC RX 250 GENERAL PHARMACY W/ HCPCS (ALT 636 FOR OP/ED): Performed by: NURSE ANESTHETIST, CERTIFIED REGISTERED

## 2025-07-08 PROCEDURE — A29888 PR KNEE SCOPE,AID ANT CRUCIATE REPAIR: Performed by: ANESTHESIOLOGY

## 2025-07-08 PROCEDURE — 7100000002 HC RECOVERY ROOM TIME - EACH INCREMENTAL 1 MINUTE: Performed by: ORTHOPAEDIC SURGERY

## 2025-07-08 PROCEDURE — C1713 ANCHOR/SCREW BN/BN,TIS/BN: HCPCS | Performed by: ORTHOPAEDIC SURGERY

## 2025-07-08 PROCEDURE — 7100000010 HC PHASE TWO TIME - EACH INCREMENTAL 1 MINUTE: Performed by: ORTHOPAEDIC SURGERY

## 2025-07-08 PROCEDURE — 64447 NJX AA&/STRD FEMORAL NRV IMG: CPT | Performed by: ANESTHESIOLOGY

## 2025-07-08 PROCEDURE — 2720000007 HC OR 272 NO HCPCS: Performed by: ORTHOPAEDIC SURGERY

## 2025-07-08 PROCEDURE — A4550 SURGICAL TRAYS: HCPCS | Performed by: ORTHOPAEDIC SURGERY

## 2025-07-08 PROCEDURE — 3700000002 HC GENERAL ANESTHESIA TIME - EACH INCREMENTAL 1 MINUTE: Performed by: ORTHOPAEDIC SURGERY

## 2025-07-08 PROCEDURE — 3700000001 HC GENERAL ANESTHESIA TIME - INITIAL BASE CHARGE: Performed by: ORTHOPAEDIC SURGERY

## 2025-07-08 PROCEDURE — 2500000004 HC RX 250 GENERAL PHARMACY W/ HCPCS (ALT 636 FOR OP/ED): Performed by: ORTHOPAEDIC SURGERY

## 2025-07-08 PROCEDURE — 29888 ARTHRS AID ACL RPR/AGMNTJ: CPT | Performed by: SPECIALIST/TECHNOLOGIST

## 2025-07-08 PROCEDURE — 29888 ARTHRS AID ACL RPR/AGMNTJ: CPT | Performed by: ORTHOPAEDIC SURGERY

## 2025-07-08 DEVICE — BIOSURE REGENSORB INTERFERENCE                                    SCREW 7 MM X 20MM
Type: IMPLANTABLE DEVICE | Site: KNEE | Status: FUNCTIONAL
Brand: BIOSURE

## 2025-07-08 DEVICE — IMPLANTABLE DEVICE: Type: IMPLANTABLE DEVICE | Site: KNEE | Status: FUNCTIONAL

## 2025-07-08 RX ORDER — ASPIRIN 81 MG/1
81 TABLET ORAL 2 TIMES DAILY
Qty: 28 TABLET | Refills: 0 | Status: SHIPPED | OUTPATIENT
Start: 2025-07-09 | End: 2025-07-23

## 2025-07-08 RX ORDER — ONDANSETRON HYDROCHLORIDE 2 MG/ML
4 INJECTION, SOLUTION INTRAVENOUS ONCE AS NEEDED
Status: DISCONTINUED | OUTPATIENT
Start: 2025-07-08 | End: 2025-07-08 | Stop reason: HOSPADM

## 2025-07-08 RX ORDER — CEFAZOLIN SODIUM 2 G/100ML
2 INJECTION, SOLUTION INTRAVENOUS ONCE
Status: COMPLETED | OUTPATIENT
Start: 2025-07-08 | End: 2025-07-08

## 2025-07-08 RX ORDER — ONDANSETRON 4 MG/1
4 TABLET, ORALLY DISINTEGRATING ORAL EVERY 8 HOURS PRN
Qty: 30 TABLET | Refills: 0 | Status: SHIPPED | OUTPATIENT
Start: 2025-07-08 | End: 2025-07-18

## 2025-07-08 RX ORDER — FENTANYL CITRATE 50 UG/ML
INJECTION, SOLUTION INTRAMUSCULAR; INTRAVENOUS AS NEEDED
Status: DISCONTINUED | OUTPATIENT
Start: 2025-07-08 | End: 2025-07-08

## 2025-07-08 RX ORDER — HYDRALAZINE HYDROCHLORIDE 20 MG/ML
5 INJECTION INTRAMUSCULAR; INTRAVENOUS EVERY 30 MIN PRN
Status: DISCONTINUED | OUTPATIENT
Start: 2025-07-08 | End: 2025-07-08 | Stop reason: HOSPADM

## 2025-07-08 RX ORDER — MIDAZOLAM HYDROCHLORIDE 1 MG/ML
INJECTION, SOLUTION INTRAMUSCULAR; INTRAVENOUS AS NEEDED
Status: DISCONTINUED | OUTPATIENT
Start: 2025-07-08 | End: 2025-07-08

## 2025-07-08 RX ORDER — FENTANYL CITRATE 50 UG/ML
50 INJECTION, SOLUTION INTRAMUSCULAR; INTRAVENOUS ONCE
Status: COMPLETED | OUTPATIENT
Start: 2025-07-08 | End: 2025-07-08

## 2025-07-08 RX ORDER — PROPOFOL 10 MG/ML
INJECTION, EMULSION INTRAVENOUS AS NEEDED
Status: DISCONTINUED | OUTPATIENT
Start: 2025-07-08 | End: 2025-07-08

## 2025-07-08 RX ORDER — DIPHENHYDRAMINE HYDROCHLORIDE 50 MG/ML
12.5 INJECTION, SOLUTION INTRAMUSCULAR; INTRAVENOUS ONCE AS NEEDED
Status: DISCONTINUED | OUTPATIENT
Start: 2025-07-08 | End: 2025-07-08 | Stop reason: HOSPADM

## 2025-07-08 RX ORDER — ROPIVACAINE HYDROCHLORIDE 5 MG/ML
INJECTION, SOLUTION EPIDURAL; INFILTRATION; PERINEURAL
Status: COMPLETED | OUTPATIENT
Start: 2025-07-08 | End: 2025-07-08

## 2025-07-08 RX ORDER — DOCUSATE SODIUM 100 MG/1
100 CAPSULE, LIQUID FILLED ORAL 2 TIMES DAILY
Qty: 30 CAPSULE | Refills: 0 | Status: SHIPPED | OUTPATIENT
Start: 2025-07-08 | End: 2025-07-23

## 2025-07-08 RX ORDER — LIDOCAINE HYDROCHLORIDE 10 MG/ML
INJECTION, SOLUTION EPIDURAL; INFILTRATION; INTRACAUDAL; PERINEURAL AS NEEDED
Status: DISCONTINUED | OUTPATIENT
Start: 2025-07-08 | End: 2025-07-08

## 2025-07-08 RX ORDER — ONDANSETRON HYDROCHLORIDE 2 MG/ML
INJECTION, SOLUTION INTRAVENOUS AS NEEDED
Status: DISCONTINUED | OUTPATIENT
Start: 2025-07-08 | End: 2025-07-08

## 2025-07-08 RX ORDER — OXYCODONE AND ACETAMINOPHEN 5; 325 MG/1; MG/1
1 TABLET ORAL EVERY 6 HOURS PRN
Qty: 20 TABLET | Refills: 0 | Status: SHIPPED | OUTPATIENT
Start: 2025-07-08 | End: 2025-07-13

## 2025-07-08 RX ORDER — ALBUTEROL SULFATE 0.83 MG/ML
2.5 SOLUTION RESPIRATORY (INHALATION) ONCE AS NEEDED
Status: DISCONTINUED | OUTPATIENT
Start: 2025-07-08 | End: 2025-07-08 | Stop reason: HOSPADM

## 2025-07-08 RX ORDER — OXYCODONE HYDROCHLORIDE 5 MG/1
5 TABLET ORAL EVERY 4 HOURS PRN
Status: DISCONTINUED | OUTPATIENT
Start: 2025-07-08 | End: 2025-07-08 | Stop reason: HOSPADM

## 2025-07-08 RX ORDER — SODIUM CHLORIDE, SODIUM LACTATE, POTASSIUM CHLORIDE, CALCIUM CHLORIDE 600; 310; 30; 20 MG/100ML; MG/100ML; MG/100ML; MG/100ML
100 INJECTION, SOLUTION INTRAVENOUS CONTINUOUS
Status: DISCONTINUED | OUTPATIENT
Start: 2025-07-08 | End: 2025-07-08 | Stop reason: HOSPADM

## 2025-07-08 RX ORDER — MEPERIDINE HYDROCHLORIDE 25 MG/ML
12.5 INJECTION INTRAMUSCULAR; INTRAVENOUS; SUBCUTANEOUS EVERY 10 MIN PRN
Status: DISCONTINUED | OUTPATIENT
Start: 2025-07-08 | End: 2025-07-08 | Stop reason: HOSPADM

## 2025-07-08 RX ORDER — MIDAZOLAM HYDROCHLORIDE 1 MG/ML
2 INJECTION, SOLUTION INTRAMUSCULAR; INTRAVENOUS ONCE
Status: COMPLETED | OUTPATIENT
Start: 2025-07-08 | End: 2025-07-08

## 2025-07-08 RX ORDER — KETOROLAC TROMETHAMINE 30 MG/ML
INJECTION, SOLUTION INTRAMUSCULAR; INTRAVENOUS AS NEEDED
Status: DISCONTINUED | OUTPATIENT
Start: 2025-07-08 | End: 2025-07-08

## 2025-07-08 RX ORDER — HYDROMORPHONE HYDROCHLORIDE 0.2 MG/ML
0.2 INJECTION INTRAMUSCULAR; INTRAVENOUS; SUBCUTANEOUS EVERY 5 MIN PRN
Status: DISCONTINUED | OUTPATIENT
Start: 2025-07-08 | End: 2025-07-08 | Stop reason: HOSPADM

## 2025-07-08 RX ADMIN — FENTANYL CITRATE 50 MCG: 50 INJECTION, SOLUTION INTRAMUSCULAR; INTRAVENOUS at 11:08

## 2025-07-08 RX ADMIN — CEFAZOLIN SODIUM 2 G: 2 INJECTION, SOLUTION INTRAVENOUS at 10:30

## 2025-07-08 RX ADMIN — ROPIVACAINE HYDROCHLORIDE 20 ML: 5 INJECTION, SOLUTION EPIDURAL; INFILTRATION; PERINEURAL at 10:12

## 2025-07-08 RX ADMIN — FENTANYL CITRATE 50 MCG: 50 INJECTION INTRAMUSCULAR; INTRAVENOUS at 10:08

## 2025-07-08 RX ADMIN — MIDAZOLAM 2 MG: 1 INJECTION INTRAMUSCULAR; INTRAVENOUS at 10:30

## 2025-07-08 RX ADMIN — HYDROMORPHONE HYDROCHLORIDE 0.5 MG: 1 INJECTION, SOLUTION INTRAMUSCULAR; INTRAVENOUS; SUBCUTANEOUS at 12:50

## 2025-07-08 RX ADMIN — DEXAMETHASONE SODIUM PHOSPHATE 5 MG: 4 INJECTION INTRA-ARTICULAR; INTRALESIONAL; INTRAMUSCULAR; INTRAVENOUS; SOFT TISSUE at 10:10

## 2025-07-08 RX ADMIN — FENTANYL CITRATE 50 MCG: 50 INJECTION, SOLUTION INTRAMUSCULAR; INTRAVENOUS at 10:34

## 2025-07-08 RX ADMIN — KETOROLAC TROMETHAMINE 30 MG: 30 INJECTION, SOLUTION INTRAMUSCULAR at 12:04

## 2025-07-08 RX ADMIN — MIDAZOLAM 2 MG: 1 INJECTION INTRAMUSCULAR; INTRAVENOUS at 10:08

## 2025-07-08 RX ADMIN — PROPOFOL 200 MG: 10 INJECTION, EMULSION INTRAVENOUS at 10:35

## 2025-07-08 RX ADMIN — ONDANSETRON HYDROCHLORIDE 4 MG: 2 SOLUTION INTRAMUSCULAR; INTRAVENOUS at 12:04

## 2025-07-08 RX ADMIN — SODIUM CHLORIDE, POTASSIUM CHLORIDE, SODIUM LACTATE AND CALCIUM CHLORIDE: 600; 310; 30; 20 INJECTION, SOLUTION INTRAVENOUS at 10:30

## 2025-07-08 RX ADMIN — LIDOCAINE HYDROCHLORIDE 5 ML: 10 INJECTION, SOLUTION EPIDURAL; INFILTRATION; INTRACAUDAL; PERINEURAL at 10:35

## 2025-07-08 RX ADMIN — ROPIVACAINE HYDROCHLORIDE 15 ML: 5 INJECTION, SOLUTION EPIDURAL; INFILTRATION; PERINEURAL at 10:10

## 2025-07-08 SDOH — HEALTH STABILITY: MENTAL HEALTH: CURRENT SMOKER: 0

## 2025-07-08 ASSESSMENT — PAIN SCALES - GENERAL
PAINLEVEL_OUTOF10: 0 - NO PAIN
PAIN_LEVEL: 2
PAINLEVEL_OUTOF10: 7
PAINLEVEL_OUTOF10: 0 - NO PAIN
PAINLEVEL_OUTOF10: 6
PAINLEVEL_OUTOF10: 4
PAINLEVEL_OUTOF10: 7

## 2025-07-08 ASSESSMENT — PAIN DESCRIPTION - DESCRIPTORS
DESCRIPTORS: ACHING
DESCRIPTORS: STABBING;ACHING
DESCRIPTORS: ACHING;STABBING
DESCRIPTORS: ACHING
DESCRIPTORS: ACHING
DESCRIPTORS: ACHING;SHARP
DESCRIPTORS: ACHING

## 2025-07-08 ASSESSMENT — PAIN - FUNCTIONAL ASSESSMENT
PAIN_FUNCTIONAL_ASSESSMENT: 0-10
PAIN_FUNCTIONAL_ASSESSMENT: FLACC (FACE, LEGS, ACTIVITY, CRY, CONSOLABILITY)
PAIN_FUNCTIONAL_ASSESSMENT: 0-10

## 2025-07-08 ASSESSMENT — PAIN DESCRIPTION - ORIENTATION: ORIENTATION: RIGHT

## 2025-07-08 ASSESSMENT — COLUMBIA-SUICIDE SEVERITY RATING SCALE - C-SSRS
2. HAVE YOU ACTUALLY HAD ANY THOUGHTS OF KILLING YOURSELF?: NO
6. HAVE YOU EVER DONE ANYTHING, STARTED TO DO ANYTHING, OR PREPARED TO DO ANYTHING TO END YOUR LIFE?: NO
1. IN THE PAST MONTH, HAVE YOU WISHED YOU WERE DEAD OR WISHED YOU COULD GO TO SLEEP AND NOT WAKE UP?: NO

## 2025-07-08 ASSESSMENT — PAIN DESCRIPTION - LOCATION: LOCATION: KNEE

## 2025-07-08 NOTE — OP NOTE
RIGHT Knee ACL Reconstruction Operative Note     Date: 2025  OR Location: [facility]    Name: Osmany Tapia, : 2005, Age: 19 y.o., MRN: [id number], Sex: male    Diagnosis  Pre-op diagnosis      * Right knee anterior cruciate ligament rupture Post-op diagnosis      * Right knee anterior cruciate ligament rupture     Procedures  Right knee arthroscopic ACL reconstruction with BTB autograft  Right knee diagnostic arthroscopy and examination under anesthesia    Surgeons   Geovani Gaines MD    Resident/Fellow/Other Assistant:  ISAAC Rosado    Procedure Summary  Anesthesia: Regional and LMA ASA: I  Anesthesia Staff: Anesthesiologist: Geovani Knowles MD  CRNA: ACE Duggan-CRNA  Estimated Blood Loss: 10 mL  Intra-op Medications:   Medication Name Total Dose   ceFAZolin in dextrose (iso-os) (Ancef) IVPB 2 g 2 g   fentaNYL PF (Sublimaze) injection 50 mcg 50 mcg   midazolam (Versed) injection 2 mg 2 mg          Anesthesia Record               Intraprocedure I/O Totals          Intake    ceFAZolin in dextrose (iso-os) (Ancef) IVPB 2 g 100.00 mL    Total Intake 100 mL          Specimen: No specimens collected     Staff:   Circulator: Jaclyn Garnica RN  Relief Circulator: Zach Vazquez RN  Relief Scrub: Kelly Ravi RN  Scrub Person: David Reese     Drains and/or Catheters: None    Tourniquet Times:   * Missing tourniquet times found for documented tourniquets in lo *     Implants:  Implants       Type Name Action Serial No.              Screw SCREW, BIOSURE REGENESORB, 6 X 20MM - [id number] Implanted      Screw SCREW, BIOSURE REGENESORB, 7  X 25MM - [id number] Implanted                     Findings: ACL tear, Intact meniscus    Indications: Osmany Tapia is a 19 y.o. male who suffered a right knee anterior cruciate ligament injury during sporting activities. The patient continues to report knee pain, instability and swelling. Physical examination and MRI confirmed findings. Knee  arthroscopy and reconstruction is indicated. Risks and benefits were discussed with the patient. After questions were answered consent was obtained to proceed. In the holding area of the right knee was signed as the appropriate operative site, and the patient was positively identified. We agreed upon the use of autograft tissue.    Procedure: In the operative suite the patient was placed supine on the table. An LMA was inserted by the anesthesiologist. A right thigh tourniquet was placed. The right lower extremity was then prepped and draped in the usual sterile fashion. A timeout was performed and 2 g Ancef were given intravenously prior to initiating the procedure.  Right knee examination under anesthesia was performed. Full range of motion. Stable medial collateral ligament. Stable lateral collateral ligament. Stable posterior drawer. Positive Lachman and anterior drawer. Positive pivot glide.  The procedure was initiated by harvesting the patellar tendon through a longitudinal incision over the anterior knee. Superficial bleeders were cauterized. The peritenon was then incised and protected for later repair. The central 10 mm of the patellar tendon was then harvested with attached 10 x 20 mm bone plugs harvested from the tibial tubercle and patella with an oscillating saw, respectively. The graft was harvested without incident and was of excellent quality.  On the back table, ISAAC Rosado prepared the anterior cruciate ligament graft. The graft was prepared with cylindrical bone plugs through its drill holes were placed for passing sutures. The graft was prepared without incident and was of excellent quality.  A standard 2 portal diagnostic arthroscopy technique was utilized. The camera was inserted into the joint in an atraumatic fashion.  The suprapatellar pouch and gutters were unremarkable and free of debris.  The patellofemoral articular cartilage was intact.  The medial compartment was then entered.  Medial femoral condyle articular cartilage was intact. The medial meniscus was probed and intact.  The lateral compartment was entered. Lateral femoral condyle articular cartilage was intact. The lateral meniscus was probed and intact.  There was a small vertical incomplete crack that was quite stable.  The notch was then entered. The PCL was intact. The anterior cruciate ligament was ruptured at its mid substance. The notch was slightly narrow.  The anterior cruciate ligament was then debrided back to its footprints. A 5.5 mm shaver was utilized to perform a notchplasty along with the use of a 70° arthroscope. This opened up the slightly stenotic notch and allowed for visualization of the back wall. Using a medial portal technique a flexible guidepin was placed in the center of the anterior cruciate ligament footprint. A flexible reamer was then used to drill a 10 x 25 mm tunnel leaving a 2 mm back wall. Care was taken to protect the medial femoral condyle with reaming. The tunnel was reamed without incident and was in an anatomic position. A passing suture was pulled through for later graft passage.  The tibial tunnel was then reamed over a guide pin placed in the center of the anterior cruciate ligament tibial footprint. A 10 mm cylindrical reamer was used to create the tunnel. The reamings were saved for later autogenous bone grafting. The tibial tunnel was in excellent position.  The knee was then lavaged and suctioned of debris.  Right knee arthroscopic anterior cruciate ligament reconstruction with bone-patellar tendon-bone autograft was then completed by passing the graft into the knee. The femoral bone plug docked nicely.  The femoral bone plug was secured with a 6 x 20 mm bio composite screw for an excellent interference fit.  The knee was then cycled and brought into extension. There was no evidence of notch impingement. Tension was held on the graft and the knee was brought again into extension.  The  tibial bone plug was secured with a 7 x 25 mm bio composite screw to complete graft fixation with a secure interference fit.  The anterior cruciate ligament graft was then probed and stable. The Lachman and pivot shift were performed and stable.  The knee was then lavaged and suctioned of debris. Instruments were removed and fluid expelled. Portals were closed with interrupted 3-0 nylon sutures. The central one third of the patellar tendon was then reapproximated with interrupted 0 Vicryl sutures. The patella and tibial harvest sites were bone grafted with autogenous bone graft. The peritenon was then closed over the top with interrupted 2-0 nylon sutures. A layered closure was performed in the skin followed by running subcuticular Monocryl and Steri-Strips.  All sponge counts and needle counts are correct. There were no complications. A Xeroform and sterile gauze dressing was applied followed by a bulky cotton web roll, Ace wrap and hinged knee brace locked in extension. A cryotherapy device is utilized. The patient was transported awake, extubated, and in stable condition to the recovery room without incident.  Postoperative DVT prophylaxis included aspirin, compressive stockings, active ankle pumps and early ambulation.  ISAAC Rosado was present for the entire case. His assistance was necessary and critical to the successful completion of the procedure. He provided skilled assistance with preparation of the anterior cruciate ligament graft, graft passage and skin closure. A qualified assistant was not available to perform his portion of the case.    Complications:  None; patient tolerated the procedure well.    Disposition: PACU - hemodynamically stable.  Condition: stable     Attending Attestation: I performed the procedure.    Geovani Gaines  Phone Number: [phone]

## 2025-07-08 NOTE — PERIOPERATIVE NURSING NOTE
Pt doing well in recovery. Pt tolerates po well.  Pain as tolerable at this time. Pt ready for phase. 2.

## 2025-07-08 NOTE — H&P
"History Of Present Illness  Osmany Tapia is a 19 y.o. male presenting with right knee pain occurring on 5/18/2025 while in a men soccer league he was landing from a header and felt a \"crack\".  He reports achy and unstable knee pain is worsened with knee flexion getting in and out of the car and going up and down stairs.  He denies prior right knee injuries.     Past Medical History  He has a past medical history of Asthma, Joint pain, Otitis media, unspecified, left ear (11/16/2018), Personal history of other (healed) physical injury and trauma (11/16/2018), and Personal history of other infectious and parasitic diseases (01/23/2019).    He has no past medical history of Refusal of blood product.    Surgical History  He has no past surgical history on file.     Social History  He reports that he has never smoked. He has never been exposed to tobacco smoke. He has never used smokeless tobacco. He reports that he does not currently use alcohol after a past usage of about 1.0 standard drink of alcohol per week. He reports that he does not use drugs.    Family History  Family History[1]     Allergies  Amoxicillin and Peanut    Review of Systems  Negative unless stated in the HPI  Physical Exam  19 y.o. year-old male antalgic in no acute distress. Well nourished. Normal affect. Alert and oriented x 3.   Gait: Normal Tandem. Neutral alignment. Able to perform single leg stance. No abnormalities of balance or coordination.  Skin: Intact over the bilateral upper and lower extremities. No erythema, ecchymosis, or temperature changes.  Negative bilateral popliteal lymphadenopathy.  Hips: Painless ROM in all planes bilaterally. No tenderness to palpation.     Right Knee:  ROM: 5-11 positive 0 degrees. Negative crepitus.  And anterior drawer positive with guarded effusion.  Good quadriceps contraction. Intact extensor mechanism. Patellar tendon non-tender.  Patella facets non-tender. Negative apprehension. Negative tilt. " "  Lachman stable. Anterior drawer stable. Pivot negative. Posterior drawer negative.  Stable medial collateral ligament. Stable lateral collateral ligament.   Positive medial joint line tenderness.  Positive Doretha's.  Positive lateral joint line tenderness.  Positive Doretha's.        Left Knee:  ROM: 0-140 degrees. Negative crepitus.  No effusion.  Good quadriceps contraction. Intact extensor mechanism. Patellar tendon non-tender.  Patella facets non-tender. Negative apprehension. Negative tilt.   Lachman stable. Anterior drawer stable. Pivot negative. Posterior drawer negative.  Stable medial collateral ligament. Stable lateral collateral ligament.   Negative medial joint line tenderness.  Negative Doretha's.  Negative lateral joint line tenderness.  Negative Doretha's.        Motor Strength: 5 out of 5 in the bilateral lower extremities.  Neuro: L4-S1 sensation intact grossly bilaterally. No clonus. 2+ and symmetric Patella and Achilles bilateral reflexes.  Vascular: 2+ DP/PT pulses bilaterally. Bilateral lower extremity compartments supple.     Imaging  MRI of the right knee is consistent with complete rupture of the ACL with translational bone bruises and effusion.  Positive tearing of the medial and lateral meniscus.  Articular cartilage grossly maintained.  Mild MCL sprain.  Growth plates closed.  Last Recorded Vitals  Blood pressure 141/73, pulse 82, temperature 36.9 °C (98.4 °F), temperature source Temporal, resp. rate 17, height 1.778 m (5' 10\"), weight 87 kg (191 lb 12.8 oz), SpO2 100%.    Relevant Results    Right knee MRI is consistent with acute anterior cruciate ligament rupture, medial and lateral meniscus tears   Scheduled medications  Scheduled Medications[2]  Continuous medications  Continuous Medications[3]  PRN medications  PRN Medications[4]  No results found for this or any previous visit (from the past 24 hours).    Assessment/Plan   Assessment & Plan  Complete tear of anterior cruciate " ligament of knee, right, subsequent encounter    Complex tear of medial meniscus of right knee, subsequent encounter    Complex tear of lateral meniscus of right knee, subsequent encounter      We had a discussion with the patient regarding his conservative and surgical treatment options.  He continues to have persistent instability and pain and desires to remain active.  He wishes to pursue a right knee arthroscopic anterior cruciate ligament reconstruction and meniscus repair versus debridement.  The risk and benefits of the procedure were discussed at length with the patient and he wishes to proceed.         Jluis Wills PA-C         [1]   Family History  Problem Relation Name Age of Onset    Thyroid disease Father      Cancer Paternal Grandfather          stomach    Other (MALIGNANT NEOPLASM) Paternal Grandfather      Thyroid disease Paternal Grandfather      Arthritis Mother Lashell Incorvati 50 - 59   [2] ceFAZolin, 2 g, intravenous, Once    [3] lactated Ringer's, 100 mL/hr    [4]

## 2025-07-08 NOTE — ANESTHESIA PROCEDURE NOTES
Peripheral Block    Patient location during procedure: pre-op  Medication administered at: 7/8/2025 10:12 AM  End time: 7/8/2025 10:14 AM  Reason for block: at surgeon's request and post-op pain management  Staffing  Performed: attending   Authorized by: Geovani Knowles MD    Performed by: Geovani Knowles MD  Preanesthetic Checklist  Completed: patient identified, IV checked, site marked, risks and benefits discussed, surgical consent, monitors and equipment checked, pre-op evaluation and timeout performed   Timeout performed at: 7/8/2025 10:09 AM  Peripheral Block  Patient position: laying flat  Prep: ChloraPrep  Patient monitoring: heart rate, cardiac monitor and continuous pulse ox  Block type: adductor canal  Laterality: right  Injection technique: single-shot  Guidance: ultrasound guided  Needle  Needle gauge: 21 G  Needle length: 10 cm  Needle localization: ultrasound guidance  Test dose: negative  Assessment  Injection assessment: negative aspiration for heme, no paresthesia on injection, incremental injection and local visualized surrounding nerve on ultrasound  Paresthesia pain: none  Heart rate change: no  Slow fractionated injection: yes  Additional Notes  Ropivicaine 20 ml 0.5 % with 5 mg Decadron  Medications Administered  dexAMETHasone (Decadron) injection - perineural injection   5 mg - 7/8/2025 10:12:00 AM  ropivacaine (NAROPIN) 5 MG/ML Perineural - perineural injection   20 mL - 7/8/2025 10:12:00 AM

## 2025-07-08 NOTE — DISCHARGE INSTRUCTIONS
Geovani Gaines M.D.  Department of Orthopedics  [address] [place] [address]  Office: [phone]    POST OPERATIVE INSTRUCTIONS FOR ACL RECONSTRUCTION    General Anesthesia or Sedation  You have been given medications that affect your balance and coordination for 24 hours.  Go directly home from the hospital and rest for the remainder of the day.  Have a responsible adult stay with you today and overnight.  Do not drive or operate equipment, conduct business or sign any legal documents for the next 24 hours or while taking pain medication.  Do not drink alcohol, take tranquilizers or sleeping pills for the next 24 hours or while taking pain medication.  Deep breathe and cough two times at least every 4 hours today and tomorrow while you are awake. This will help keep fevers away.   Use your CPAP or BiPAP machine whenever sleeping during the day or night.    Diet  Start a light meal and advance to your regular diet as tolerated    Dressing/Wound Care  Keep your dressing clean and dry until seen in the office or by physical therapy  You dressing will be removed at your first post op appointment with the surgical team or with physical therapy.  You may see some spotting or drainage on your dressing, this is normal.  The purpose of the dressing is to apply pressure to the incision and to soak up any discharge or drainage from the incision.  Once the dressing has been removed, inspect the incisions daily for and changes. Some bleeding or light draining may be on the dressing. Bruising around the incisions, the back of the knee and down the shin are normal and will fade away.     Showering/Bathing  Once the dressing has been removed you may shower. Please cover the incisions with water proof band aids or a plastic wrap. Incisions should stay dry until sutures have been removed.   Allow soap and water to gently run over the operative area and pat dry when covered until incisions are fully  healed    Activity and Exercise  Wear your immobilizer or brace until follow up appointment.  Your knee brace is set at a range of motion of 0 degrees. It will be adjusted once seen by the surgical team or physical therapy  Begin  Sports Medicine leg exercises (see instruction sheet) on post op day 1.  Your weight bearing status until your follow up appointment is:  Non-weight bearing - operative extremity may not bear any weight and you must use crutches at all times. Weight bearing status will be adjusted once seen by surgical team or physical therapy.    **Physical Therapy can start 3 - 4 days post op. Please schedule. Your physical order should be in the pre-surgery packet you were sent. If you do not have one, please contact the office.     Ice/Polar Care  Apply an ice pack every 2 hours for 20 - 30 minutes while awake for the first 2 - 3 days.  Then 3 - 5 times a day for 20 minutes until seen by your surgeon.  Never place an ice pack directly on skin.  Always have a barrier such as a towel between the ice pack and your skin.  Your Polar Care unit may be used continuously for the first 2 days postoperatively, then 3 - 5 times daily for 30 minutes until seen by your surgeon.  Refill with ice and water as needed.    Elevation  Elevating your operative extremity will lessen swelling and discomfort.    Support Hose  Wear the support hose sent home with you at all times if you were provided them.  Please take the additional hose with you to the Physical Therapist or Physician office to put on your surgical leg after the dressing is removed.  You may take the hose off to hand wash and air dry, do not place them in the washer or dryer.  You will need to wear the support hose until cleared by your physician.  Wearing compression stockings, using blood thinning medications (typically aspirin), and performing ankle pumps help prevent blood clots!    Medications  Pain medications should be taken with food.  You may  experience dizziness or drowsiness while taking your prescribed pain medication.   DO NOT DRIVE!  DO NOT DRINK ALCOHOL!  Pain medication can be constipating, drink plenty of fluids and increase your fiber intake to avoid this problem.  If you develop constipation, Colace is an over the counter stool softener you may use.  New Take Home Medications - Take as directed on bottle.    Resume your prescription medications as directed by your physician.    Do not take other medications containing Tylenol while on your pain medications.    When To Notify Your Physician  Persistent temperature greater than 101°F.  Increase or severe pain that does not respond to elevation, ice or pain medication.  Unexplained redness, swelling, numbness or tingling that does not improve with elevation or ice.  Increased amount or change in color of drainage.  Persistent nausea and vomiting.  Fingers and toes should remain pink and warm.  Notify your doctor if they become cool, grey or numb.  If you cannot reach your surgeon, seek care at an Urgent Care Center or Emergency Room.    For questions or concerns regarding your care please contact your surgeon      Dr. Geovani Gaines    [phone]   Jluis Wills PA-C    [phone]   After hours and weekends   [phone]    Post Operative Appointment:  Please call [facility] at [phone] to schedule your first appointment with Paolo Lake PA-C/Jluis Wills PA-C in 10-12 days if not already scheduled.    PLEASE NOTE:  Additional information and instruction will be provided by your physician regarding your surgical procedure and continued care at your initial post operative office appointment.                Heel Slide:   If brace is on wait on this. Sitting, pull foot towards body.    Assist stretch with hands. Hold for 5 seconds, then bend a   little bit farther and hold for another 5 seconds then relax.  Repeat 15 times, 6 times per day.           Heel Prop:  Whenever sitting, place  heel on a footrest. Heel must  be high enough so your calf and thigh are off the ground.      Towel/Cord Stretch-Toe pulls:       Sitting, wrap towel or cord around foot.  Pull   With one hand to raise foot.  Stabilize your leg  by placing your other hand on your thigh. Pull  on strap with thigh muscle relaxed for 5 seconds.  Then contract thigh muscle while releasing cord  and hold heel up for 5 seconds. Repeat  sequence 10 times, 6 times per day.      Quad Sets:   Tighten thigh muscle while pushing your knee   down into the towel.  Hold for 5 seconds then rest   for 5 seconds and repeat.  Perform 10 times, 6   times per day.    Straight Leg Raise:          Sit with back supported, or lay down. Tighten front thigh muscle, pull toe   back toward you, then lift leg 8-10   inches off surface. Pause at the top and   lower   slowly to start position. Repeat 15   times, 6 times per day. Sometimes this is easier a week after surgery.       ** Extension Habit ** When rising to stand, shift weight to involved leg and tighten thigh muscle to lock out the knee.  Hold for 10 seconds.    ** Don't forget ankle pumps throughout the day as well!!

## 2025-07-08 NOTE — ANESTHESIA PROCEDURE NOTES
Airway  Date/Time: 7/8/2025 10:37 AM  Reason: elective    Airway not difficult    Staffing  Performed: CRNA   Authorized by: Geovani Knowles MD    Performed by: BALA Duggan  Patient location during procedure: OR    Patient Condition  Indications for airway management: anesthesia  Patient position: sniffing  MILS maintained throughout  Sedation level: deep     Final Airway Details   Preoxygenated: yes  Final airway type: supraglottic airway  Successful airway:   Size: 4  Number of attempts at approach: 1

## 2025-07-08 NOTE — NURSING NOTE
Patient in Phase 2; dressed and up to chair with RN assist. Tolerating po fluids, minimal complaint of pain and no complaint of nausea.     RN got handoff report from VALENTIN RN in phase 2    Family at bedside; discussed discharge instructions with patient and Family. All questions at this time answered.     Discharge instructions provided using teachback method.  Patient's health-related risk factors discussed with patient.  Patient educated to look for worsening signs and symptoms and educated to seek medical attention if experiencing medical emergency.  Patient aware of needs to follow up with outpatient clinics as scheduled. Home going meds reviewed with patient.  Patient verbalized understanding of disposition and discharge instructions.  All questions answered to patient's satisfaction and within nursing scope of practice.    Patient clinically appropriate for discharge. Vitals stable/baseline.IV removed and patient transported to discharge area via wheelchair.

## 2025-07-08 NOTE — ANESTHESIA PROCEDURE NOTES
Peripheral Block    Patient location during procedure: pre-op  Medication administered at: 7/8/2025 10:10 AM  End time: 7/8/2025 10:12 AM  Reason for block: at surgeon's request and post-op pain management  Staffing  Performed: attending   Authorized by: Geovani Knowles MD    Performed by: Geovani Knowles MD  Preanesthetic Checklist  Completed: patient identified, IV checked, site marked, risks and benefits discussed, surgical consent, monitors and equipment checked, pre-op evaluation and timeout performed   Timeout performed at: 7/8/2025 10:09 AM  Peripheral Block  Patient position: laying flat  Prep: ChloraPrep  Patient monitoring: heart rate, cardiac monitor and continuous pulse ox  Block type: IPACK  Laterality: right  Injection technique: single-shot  Guidance: ultrasound guided  Needle  Needle gauge: 21 G  Needle length: 10 cm  Needle localization: ultrasound guidance  Test dose: negative  Assessment  Injection assessment: negative aspiration for heme, no paresthesia on injection, incremental injection and local visualized surrounding nerve on ultrasound  Paresthesia pain: none  Heart rate change: no  Slow fractionated injection: yes  Medications Administered  ropivacaine (NAROPIN) 5 MG/ML Perineural - perineural injection   15 mL - 7/8/2025 10:10:00 AM  dexAMETHasone (Decadron) injection - perineural injection   5 mg - 7/8/2025 10:10:00 AM

## 2025-07-08 NOTE — ANESTHESIA POSTPROCEDURE EVALUATION
Patient: Osmany Tapia    Procedure Summary       Date: 07/08/25 Room / Location: [facility] / [facility]    Anesthesia Start: 1030 Anesthesia Stop: 1235    Procedure: RIGHT Knee ACL Reconstruction with patella tendon autograft, medial & Lateral meniscus repair (LMA/FNB, S&N ACL set, meniscus Repair devices, suture anchors ) (Right: Knee) Diagnosis:       Complete tear of anterior cruciate ligament of knee, right, subsequent encounter      Complex tear of medial meniscus of right knee, subsequent encounter      Complex tear of lateral meniscus of right knee, subsequent encounter      (Complete tear of anterior cruciate ligament of knee, right, subsequent encounter [S83.511D])      (Complex tear of medial meniscus of right knee, subsequent encounter [S83.231D])      (Complex tear of lateral meniscus of right knee, subsequent encounter [S83.271D])    Surgeons: Geovani Gaines MD Responsible Provider: Geovani Knowles MD    Anesthesia Type: general ASA Status: 1            Anesthesia Type: general    Vitals Value Taken Time   /75 07/08/25 13:00   Temp 36.2 °C (97.2 °F) 07/08/25 12:30   Pulse 87 07/08/25 13:00   Resp 16 07/08/25 13:00   SpO2 97 % 07/08/25 13:00       Anesthesia Post Evaluation    Patient location during evaluation: PACU  Patient participation: complete - patient participated  Level of consciousness: awake  Pain score: 2  Pain management: adequate  Multimodal analgesia pain management approach  Airway patency: patent  Two or more strategies used to mitigate risk of obstructive sleep apnea  Cardiovascular status: acceptable  Respiratory status: acceptable  Hydration status: acceptable  Postoperative Nausea and Vomiting: none        There were no known notable events for this encounter.

## 2025-07-08 NOTE — BRIEF OP NOTE
Date: 2025  OR Location: [facility]    Name: Osmany Tapia, : 2005, Age: 19 y.o., MRN: [id number], Sex: male    Diagnosis  Pre-op Diagnosis      * Complete tear of anterior cruciate ligament of knee, right, subsequent encounter [S83.511D]     * Complex tear of medial meniscus of right knee, subsequent encounter [S83.231D]     * Complex tear of lateral meniscus of right knee, subsequent encounter [S83.271D] Post-op Diagnosis     * Right knee ACL tear     Procedures  Right knee arthroscopic anterior cruciate ligament reconstruction with patellar tendon autograft  Right knee diagnostic arthroscopy and examination under anesthesia    Surgeons      * Geovani Gaines - Primary    Resident/Fellow/Other Assistant:  Surgeons and Role:     * Jluis Wills PA-C - Resident - Assisting    Staff:   Circulator: Jaclyn Schroeder Person: David Yepez Scrub: Kelly Yepez Circulator: Zach    Anesthesia Staff: Anesthesiologist: Geovani Knowles MD  CRNA: CAE Duggan-CRNA    Procedure Summary  Anesthesia: General  ASA: I  Estimated Blood Loss: 10 mL  Intra-op Medications:   Administrations occurring from 1022 to 1307 on 25:   Medication Name Total Dose   EPINEPHrine (Adrenalin) 1 mg in sodium chloride 3,000 mL irrigation 1 mL   dexAMETHasone (Decadron) 4 mg/mL IV Syringe 2 mL 8 mg   dexmedeTOMIDine (Precedex) bolus from bag 18 mcg   fentaNYL (Sublimaze) injection 50 mcg/mL 100 mcg   ketorolac (Toradol) 30 mg 30 mg   lactated Ringer's infusion 327.08 mL   lidocaine PF (Xylocaine-MPF) local injection 1 % 5 mL   midazolam (Versed) injection 1 mg/mL 2 mg   ondansetron (Zofran) 2 mg/mL injection 4 mg   propofol (Diprivan) injection 10 mg/mL 200 mg   ceFAZolin (Ancef) 2 g in dextrose (iso)  mL 2 g          Anesthesia Record               Intraprocedure I/O Totals          Intake    Dexmedetomidine 0.00 mL    The total shown is the total volume documented since Anesthesia Start was filed.    Total Intake  0 mL          Specimen: No specimens collected     Findings: ACL tear, intact meniscus    Complications:  None; patient tolerated the procedure well.     Disposition: PACU - hemodynamically stable.  Condition: stable  Specimens Collected: No specimens collected  Attending Attestation: I performed the procedure.    Geovani Gaines  Phone Number: [phone]

## 2025-07-08 NOTE — ANESTHESIA PREPROCEDURE EVALUATION
Patient: Osmany Tapia    Procedure Information       Anesthesia Start Date/Time: 07/08/25 1030    Procedure: RIGHT Knee ACL Reconstruction with patella tendon autograft, medial & Lateral meniscus repair (LMA/FNB, S&N ACL set, meniscus Repair devices, suture anchors ) (Right: Knee) - LMA/FNB    Location: [facility] / [facility]    Surgeons: Geovani Gaines MD            Relevant Problems   Anesthesia (within normal limits)      Cardiac (within normal limits)      Pulmonary (within normal limits)      Neuro (within normal limits)      GI (within normal limits)      /Renal (within normal limits)      Liver (within normal limits)      Endocrine (within normal limits)      Hematology (within normal limits)      Musculoskeletal (within normal limits)      HEENT (within normal limits)      ID (within normal limits)      Skin (within normal limits)      GYN (within normal limits)       Clinical information reviewed:   Tobacco  Allergies  Meds   Med Hx  Surg Hx   Fam Hx  Soc Hx      Vitals:    07/08/25 1025   BP: 128/71   Pulse: 76   Resp: 12   Temp:    SpO2: 99%       Surgical History[1]  Medical History[2]  Current Medications[3]  Prior to Admission medications    Medication Sig Start Date End Date Taking? Authorizing Provider   ibuprofen 200 mg tablet Take 1 tablet (200 mg) by mouth every 8 hours if needed for mild pain (1 - 3) or moderate pain (4 - 6).   Yes Historical Provider, MD   aspirin 81 mg EC tablet Take 1 tablet (81 mg) by mouth 2 times a day for 14 days. Do not fill before July 9, 2025. 7/9/25 7/23/25  Jluis Wills PA-C   docusate sodium (Colace) 100 mg capsule Take 1 capsule (100 mg) by mouth 2 times a day for 15 days. 7/8/25 7/23/25  Jluis Wills PA-C   EPINEPHrine 0.3 mg/0.3 mL injection syringe Inject 0.3 mL (0.3 mg) as directed 1 time.  Patient not taking: Reported on 7/8/2025 1/6/17   Historical Provider, MD   ondansetron ODT (Zofran-ODT) 4 mg disintegrating tablet Dissolve 1  "tablet (4 mg) in the mouth every 8 hours if needed for nausea or vomiting for up to 10 days. 7/8/25 7/18/25  Jluis Wills PA-C   oxyCODONE-acetaminophen (Percocet) 5-325 mg tablet Take 1 tablet by mouth every 6 hours if needed for moderate pain (4 - 6) or severe pain (7 - 10) for up to 5 days. 7/8/25 7/13/25  Jluis Wills PA-C     RX Allergies[4]  Social History     Tobacco Use    Smoking status: Never     Passive exposure: Never    Smokeless tobacco: Never   Substance Use Topics    Alcohol use: Not Currently     Alcohol/week: 1.0 standard drink of alcohol     Types: 1 Standard drinks or equivalent per week         Chemistry    No results found for: \"NA\", \"K\", \"CL\", \"CO2\", \"BUN\", \"CREATININE\", \"GLU\" No results found for: \"CALCIUM\", \"ALKPHOS\", \"AST\", \"ALT\", \"BILITOT\"       No results found for: \"WBC\", \"HGB\", \"HCT\", \"PLT\"  No results found for: \"PROTIME\", \"PTT\", \"INR\"  No results found for this or any previous visit (from the past 4464 hours).      NPO Detail:  NPO/Void Status  Date of Last Liquid: 07/07/25  Time of Last Liquid: 2330  Date of Last Solid: 07/07/25  Time of Last Solid: 2300  Time of Last Void: 0815         Physical Exam    Airway  Mallampati: I  TM distance: >3 FB  Neck ROM: full  Mouth opening: 3 or more finger widths     Cardiovascular    Dental - normal exam     Pulmonary    Abdominal            Anesthesia Plan    History of general anesthesia?: yes  History of complications of general anesthesia?: no    ASA 1     general and regional     The patient is not a current smoker.  Patient was not previously instructed to abstain from smoking on day of procedure.  Patient did not smoke on day of procedure.  Education provided regarding risk of obstructive sleep apnea.  intravenous induction   Anesthetic plan and risks discussed with patient.    Plan discussed with CRNA and CAA.           [1] History reviewed. No pertinent surgical history.  [2]   Past Medical History:  Diagnosis Date    Asthma  "    childhood    Joint pain     Otitis media, unspecified, left ear 11/16/2018    Acute left otitis media    Personal history of other (healed) physical injury and trauma 11/16/2018    History of trauma    Personal history of other infectious and parasitic diseases 01/23/2019    History of viral warts   [3]   Current Facility-Administered Medications:     EPINEPHrine (Adrenalin) 1 mg in sodium chloride 3,000 mL irrigation, , , PRN, Geovani Gaines MD, 1 mL at 07/08/25 1059    lactated Ringer's infusion, 100 mL/hr, intravenous, Continuous, [place] PA-C, Last Rate: 125 mL/hr at 07/08/25 1030, New Bag at 07/08/25 1030    Facility-Administered Medications Ordered in Other Encounters:     dexmedeTOMIDine (Precedex) bolus from bag, , intravenous, PRN, Brooke L Grobelny, APRN-CRNA, 10 mcg at 07/08/25 1048    fentaNYL PF (Sublimaze) injection, , intravenous, PRN, Brooke L Grobelny, APRN-CRNA, 50 mcg at 07/08/25 1034    lidocaine PF (Xylocaine) 10 mg/mL (1 %) injection, , epidural, PRN, Brooke L Grobelny, APRN-CRNA, 5 mL at 07/08/25 1035    midazolam (Versed) injection, , intravenous, PRN, Brooke L Grobelny, APRN-CRNA, 2 mg at 07/08/25 1030    propofol (Diprivan) injection, , intravenous, PRN, Brooke L Grobelny, APRN-CRNA, 200 mg at 07/08/25 1035  [4]   Allergies  Allergen Reactions    Amoxicillin Unknown    Peanut Itching and Other     Eyelid swelling, back muscles tighten

## 2025-07-11 ENCOUNTER — EVALUATION (OUTPATIENT)
Dept: PHYSICAL THERAPY | Facility: CLINIC | Age: 20
End: 2025-07-11
Payer: COMMERCIAL

## 2025-07-11 DIAGNOSIS — R29.898 WEAKNESS OF RIGHT LOWER EXTREMITY: Primary | ICD-10-CM

## 2025-07-11 DIAGNOSIS — S83.511D COMPLETE TEAR OF ANTERIOR CRUCIATE LIGAMENT OF KNEE, RIGHT, SUBSEQUENT ENCOUNTER: ICD-10-CM

## 2025-07-11 DIAGNOSIS — R26.9 GAIT DIFFICULTY: ICD-10-CM

## 2025-07-11 DIAGNOSIS — M25.561 ACUTE POSTOPERATIVE PAIN OF RIGHT KNEE: ICD-10-CM

## 2025-07-11 DIAGNOSIS — M25.661 DECREASED ROM OF RIGHT KNEE: ICD-10-CM

## 2025-07-11 DIAGNOSIS — G89.18 ACUTE POSTOPERATIVE PAIN OF RIGHT KNEE: ICD-10-CM

## 2025-07-11 PROCEDURE — 97110 THERAPEUTIC EXERCISES: CPT | Mod: GP

## 2025-07-11 PROCEDURE — 97161 PT EVAL LOW COMPLEX 20 MIN: CPT | Mod: GP

## 2025-07-11 PROCEDURE — 97116 GAIT TRAINING THERAPY: CPT | Mod: GP

## 2025-07-11 ASSESSMENT — PAIN - FUNCTIONAL ASSESSMENT: PAIN_FUNCTIONAL_ASSESSMENT: 0-10

## 2025-07-11 ASSESSMENT — PAIN SCALES - GENERAL: PAINLEVEL_OUTOF10: 5 - MODERATE PAIN

## 2025-07-11 NOTE — PROGRESS NOTES
"Physical Therapy    Physical Therapy Evaluation    Patient Name: Osmany Tapia  MRN: [id number]  Today's Date: 7/11/2025  Name and date of birth 2005 were confirmed at the start of today's session.     Time Entry:    Time Calculation  Start Time: 0850  Stop Time: 0950  Time Calculation (min): 60 min  PT Evaluation Time Entry  PT Evaluation (Low) Time Entry: 30 (including dressing removal, brace adjustment)  PT Therapeutic Procedures Time Entry  Therapeutic Exercise Time Entry: 20  Gait Training Time Entry: 10     Assessment    PT Assessment Results: Decreased strength, Decreased range of motion, Decreased mobility, Decreased skin integrity, Orthopedic restrictions, Pain  Rehab Prognosis: Excellent  Assessment Comment: Osmany is 3 days s/p right ACL reconstruction with BPTB autograft and presents with expected deficits.  He will benefit from skilled outpatient PT to progress per post op guidelines to address his deficits, learn self management strategies to facilitate his return to his PLOF.    Plan  Treatment/Interventions: Blood flow restriction therapy, Cryotherapy, Education/ Instruction, Electrical stimulation, Gait training, Manual therapy, Neuromuscular re-education, Therapeutic activities, Therapeutic exercises  PT Plan: Skilled PT  PT Frequency: 2 times per week (may need to vary frequency as we progress due to limited visits through insurance)  Duration: 12 weeks, pending progress  Onset Date: 07/08/25 (date of surgery)  Number of Treatments Authorized: 18/20 visits remain ; 20 visit \"hard max\"  Rehab Potential: Excellent  Plan of Care Agreement: Patient, Parent    Current Problem  1. Weakness of right lower extremity        2. Complete tear of anterior cruciate ligament of knee, right, subsequent encounter  Referral to Physical Therapy      3. Acute postoperative pain of right knee  Referral to Physical Therapy      4. Decreased ROM of right knee  Referral to Physical Therapy      5. Gait " difficulty            Subjective   General:  General  Reason for Referral: s/p right ACL reconstruction using BPTB autograft 7/8/25  Referred By: Dr Geovani Gaines  Past Medical History Relevant to Rehab: Reviewed in Epic and on Rehab intake from from ACL prehab.  General Comment: Osmany is 3 days s/p ACL reconstruction.  He has been using the ice machine consistently and doing his basic HEP. He has been in the locked post op brace since Tuesday and using bilateral crutches. He reports pain, difficulty ambulating with the crutches. His mother is present and participated in today's session.  Precautions:  Precautions  LE Weight Bearing Status:  (50% to WBAT per post op guidelines)  Post-Surgical Precautions:  (per post op guidelines for brace, crutches, WBING status)  Braces Applied: Post op brace in place and locked in extension until guidelines are met to allow for unlocking  Precautions Comment: Per post guidelines  Pain:  Pain Assessment: 0-10  0-10 (Numeric) Pain Score: 5 - Moderate pain (8 with attempts to move the knee)  Pain Type: Surgical pain  Pain Location: Knee  Pain Orientation: Right  Home Living:  Home Living Comment: Two story home, bedroom is upstairs, but sleeping on first floor currently.  Rail on left or right side going up stairs.    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  Some fresh blood still seeping; new gauze  replaced.   Knee swelling present.  Ace wrap and knee high compression sock in place (removed for dressing change and to assess knee); replaced after assessed.     Knee Palpation/Joint Mobility  Knee swelling (3+ Brush Test) and calf swelling present.    Knee AROM   Unable to perform AAROM- max guarding and pain  Knee PROM  R knee flexion: (140°): 30 deg  R knee extension: (0°): 0 deg    Right LE strength/ MM activation:  Fair quad set    Unable to complete SLR independent; mod to max assist to complete  Able to complete ankle pumps independently, gluteal sets    Left LE strength:  WNL throughout    Gait  At start of session:   NWB with bilateral axillary crutches; poor sequencing and keeping right leg outside of crutches on arrival  Hop to left for in/out of house (2 steps)  Improved gait pattern on level surfaces after gait education/ training today and after adjusting cruth hand rests.     Outcome Measures:  Other Measures  Lower Extremity Funtional Score (LEFS): 3     OP EDUCATION:    Outpatient Education  Individual(s) Educated: Patient, Parent  Education Provided: Home Exercise Program, POC, Post-Op Precautions, Other, Signs/Symptoms of Infection (Gait with crutches and 50% wbing status)  Risk and Benefits Discussed with Patient/Caregiver/Other: yes  Patient/Caregiver Demonstrated Understanding: yes  Plan of Care Discussed and Agreed Upon: yes  Patient Response to Education: Patient/Caregiver Verbalized Understanding of Information, Patient/Caregiver Performed Return Demonstration of Exercises/Activities, Patient/Caregiver Asked Appropriate Questions  Education Comment: reviewed and performed HEP , patient has post op handouts    Today's Treatment:  (Dressing removed prior to eval/ treatment)  Brace readjusted.  Educated on how to unlock/ lock knee brace to perform seated heel slides with assist, and passive knee flexion.    Therapeutic Ex:  Ankle pumps x 20  Calf stretch with strap 5 x 20 sec  Quad sets with tactile cueing:  15 x 5 sec  Gluteal sets:  10 x 5 sec  PROM for knee flexion in supine:  heel slide x 10 reps (to 30 deg, with pain and mm guarding  Supine SLR flexion with mod to max assist x 10 reps (mother instructed on how to assist at  home)  Instructed on passive knee flexion starting over towel roll, working toward over foot ball x 2 min at a time   AA knee flexion seated in unlocked brace x 10 reps (therapist assisted)  Reviewed HEP and use of ice machine/ ice packs.    Gait training:  Hand rest height adjusted up 1 notch.  Reviewed 50% wbing status until next week  Reviewed and practiced step to pattern with crutches for level surfaces.  Reviewed sequencing for getting in/out of house (stepping up to left leg first, leading with right leg coming down).  Suggested scooting up/down full flight of stairs this weekend if needing to go upstairs; otherwise, to continue use of main floor only this weekend).    Next visit:  Consider use of NMES for quad facilitation; work P/AA ROM, gait mechanics with crutches on level surfaces and practice stairs, education on progressing wbing as tolerated.     Goals:  Patient Goal:  To return to PLOF; eventually return to playing soccer.   STGs to be met in 6 weeks:  Pain: Will be independent with symptom management strategies and report right knee pain no worse than 2/10 with walking, stairs, ADLs, and appropriate post op exercises.  ROM: Right knee AROM at least 0-125 deg for normal gait mechanics, reciprocal stair climbing.  Edema: Minimal knee effusion (1+ stroke test or less) for good quad function and knee ROM.  Strength: Good quad facilitation, completing at least 10 SLRs without ext lag, controlling for knee valgus with 4in lat step down; good control on 8in step up, and quad strength at least 60% of uninvolved side for normalized gait and readiness to progress to more advanced phase of rehab.  Gait: Non antalgic gait on level surface, FWB, no assistive device, reciprocal stair ascending for improved home and community mobility.  Function: Will score at least 40/80 on LEFS; report independent function with all self care, ADLs, IADLs, and return to driving to commute to school.    LTGs to be met in 12+  weeks:   Pain: Right knee pain 0/10 with all daily activities and exercise progression.  ROM: Right knee AROM 0-140 for return to full PLOF.  Edema: Trace to no effusion for normal quad function and knee AROM.; no reactive swelling after ex.  Strength: Right LE MMT 5/5 throughout; ascending and descending 8 in step and 6-8 in lat step down with good quad control/ control for knee valgus; Quad strength 70-80% of left with HH dynamomter testing to initiate running program and progress to Phase 5 post op activities.  Gait: Non antalgic; reciprocal stair climbing, tolerating Alter G progression by 12 weeks post op.  Function: LEFS at least 65/80, tolerating running progression in Alter G  and low level plyometrics without rebound swelling or pain, ready to transition to advanced rehab independently as insurance visits may be exhausted. (Can explore available options for continued rehab as needed).

## 2025-07-12 PROBLEM — R26.9 GAIT DIFFICULTY: Status: ACTIVE | Noted: 2025-07-12

## 2025-07-14 ENCOUNTER — OFFICE VISIT (OUTPATIENT)
Dept: ORTHOPEDIC SURGERY | Facility: HOSPITAL | Age: 20
End: 2025-07-14
Payer: COMMERCIAL

## 2025-07-14 ENCOUNTER — TELEPHONE (OUTPATIENT)
Dept: ORTHOPEDIC SURGERY | Facility: HOSPITAL | Age: 20
End: 2025-07-14

## 2025-07-14 ENCOUNTER — TREATMENT (OUTPATIENT)
Dept: PHYSICAL THERAPY | Facility: CLINIC | Age: 20
End: 2025-07-14
Payer: COMMERCIAL

## 2025-07-14 DIAGNOSIS — M25.661 DECREASED RANGE OF MOTION (ROM) OF RIGHT KNEE: ICD-10-CM

## 2025-07-14 DIAGNOSIS — S83.231D COMPLEX TEAR OF MEDIAL MENISCUS OF RIGHT KNEE, SUBSEQUENT ENCOUNTER: ICD-10-CM

## 2025-07-14 DIAGNOSIS — R29.898 WEAKNESS OF RIGHT LOWER EXTREMITY: ICD-10-CM

## 2025-07-14 DIAGNOSIS — S83.511D DISRUPTION OF ANTERIOR CRUCIATE LIGAMENT OF RIGHT KNEE, SUBSEQUENT ENCOUNTER: Primary | ICD-10-CM

## 2025-07-14 DIAGNOSIS — S83.511D DISRUPTION OF ANTERIOR CRUCIATE LIGAMENT OF KNEE, RIGHT, SUBSEQUENT ENCOUNTER: ICD-10-CM

## 2025-07-14 PROCEDURE — 1036F TOBACCO NON-USER: CPT | Performed by: SPECIALIST/TECHNOLOGIST

## 2025-07-14 PROCEDURE — 97014 ELECTRIC STIMULATION THERAPY: CPT | Mod: GP

## 2025-07-14 PROCEDURE — 97110 THERAPEUTIC EXERCISES: CPT | Mod: GP

## 2025-07-14 PROCEDURE — 97116 GAIT TRAINING THERAPY: CPT | Mod: GP

## 2025-07-14 PROCEDURE — 99024 POSTOP FOLLOW-UP VISIT: CPT | Performed by: SPECIALIST/TECHNOLOGIST

## 2025-07-14 PROCEDURE — 99212 OFFICE O/P EST SF 10 MIN: CPT | Performed by: SPECIALIST/TECHNOLOGIST

## 2025-07-14 RX ORDER — IBUPROFEN 800 MG/1
800 TABLET, FILM COATED ORAL 3 TIMES DAILY
Qty: 42 TABLET | Refills: 0 | Status: SHIPPED | OUTPATIENT
Start: 2025-07-14 | End: 2025-07-28

## 2025-07-14 ASSESSMENT — PAIN SCALES - GENERAL: PAINLEVEL_OUTOF10: 5 - MODERATE PAIN

## 2025-07-14 ASSESSMENT — PAIN - FUNCTIONAL ASSESSMENT: PAIN_FUNCTIONAL_ASSESSMENT: 0-10

## 2025-07-14 NOTE — PROGRESS NOTES
Physical Therapy    Physical Therapy Treatment    Patient Name: Osmany Tapia  MRN: [id number]  Today's Date: 7/14/2025    Time Entry:   Time Calculation  Start Time: 1020  Stop Time: 1130  Time Calculation (min): 70 min     PT Therapeutic Procedures Time Entry  Therapeutic Exercise Time Entry: 35  Gait Training Time Entry: 15  PT Modalities Time Entry  E-Stim (Unattended) Time Entry: 15                Assessment:   Osmany is still in a lot of pain.  Exhibits fair quad set today, unable to complete an independent SLR in or out of brace. Mod edema, increased erythema noted.  Improved weight shiftinggait pattern after practice today  Plan:   Continue to progress per post op guidelines.  Work on quad function, ROM (knee ext and flexion per guidelines), progress wbing with crutches.  Monitor and update HEP.   Practice step to pattern on stairs to Osmany can access the upstairs of his home safely.    Current Problem  1. Disruption of anterior cruciate ligament of knee, right, subsequent encounter  Follow Up In Physical Therapy      2. Complex tear of medial meniscus of right knee, subsequent encounter  Follow Up In Physical Therapy      3. Decreased range of motion (ROM) of right knee  Follow Up In Physical Therapy      4. Weakness of right lower extremity  Follow Up In Physical Therapy          General     General  General Comment: Knee is very painful. Having a hard time getting around at home. Has been doing his HEP, and icing regularly.  Is out of pain meds, mother will call the Dr office to get more.    Subjective    Precautions  Precautions  Precautions Comment: Per post guidelines    Pain  Pain Assessment  Pain Assessment: 0-10  0-10 (Numeric) Pain Score: 5 - Moderate pain (10/10 this am)  Pain Type: Surgical pain  Pain Location: Knee  Pain Orientation: Right    Objective      Steri strips still in place; dried blood present; no fresh blood or seepage noted.  Compression sock in place. (Replaced with clean one  today in PT)    Significant erythema of posterior knee, proximal calf, distal posterior thigh.  Blood blister at inf/ med portion of incision- mother took a picture and with send to Dr attention via My Chart.    Distal thigh swelling is decreased slightly.    Ambulating PWB, right leg with decreased step length, leaning heavily on crutches.  Knee brace locked in ext.   Slightly improved gait pattern, improved sequencing and step length after practice/ education in clinic today.      Fair quad set with NMES assist  Achieved 35 deg AA/PROM knee flexion    Today's Treatment:  s/p right knee ACL recon with BPTB autograft, 7/8/25  EXERCISES Date 7/14/25 Date Date Date    Visit 2 (4/20) Visit Visit Visit    Post op week/ day 6 days             NMES for quad facilitation: 15 min  10 sec on/ 30 sec off  In supine             Ankle pumps X 20      Calf stretch with strap 5 x 20 sec      Gluteal sets 10 x 5 sec             Supine heel slides PT assisted x 15      Supine SLR flexion:  Out of brace  In locked brace   X 10 max A  X 10 max      Sidelying hip abduction X 15 min A, min cues             Seated heel slides in unlocked brace X 15 with PT assist                                                                                                 Gait training:  Weightshifting  Sequencing with crutches, in locked brace and practice 15 min total  Lateral/ A/P x 5 min  10 min in department, work to increase step length, wbing, work on improved technique                    Patient Education Reviewed elevation above level of hear, with ice and getting up frequently during the day.      HEP Reviewed            Goals:  Patient Goal:  To return to PLOF; eventually return to playing soccer.   STGs to be met in 6 weeks:  Pain: Will be independent with symptom management strategies and report right knee pain no worse than 2/10 with walking, stairs, ADLs, and appropriate post op exercises.  ROM: Right knee AROM at least 0-125 deg for  normal gait mechanics, reciprocal stair climbing.  Edema: Minimal knee effusion (1+ stroke test or less) for good quad function and knee ROM.  Strength: Good quad facilitation, completing at least 10 SLRs without ext lag, controlling for knee valgus with 4in lat step down; good control on 8in step up, and quad strength at least 60% of uninvolved side for normalized gait and readiness to progress to more advanced phase of rehab.  Gait: Non antalgic gait on level surface, FWB, no assistive device, reciprocal stair ascending for improved home and community mobility.  Function: Will score at least 40/80 on LEFS; report independent function with all self care, ADLs, IADLs, and return to driving to commute to school.    LTGs to be met in 12+ weeks:   Pain: Right knee pain 0/10 with all daily activities and exercise progression.  ROM: Right knee AROM 0-140 for return to full PLOF.  Edema: Trace to no effusion for normal quad function and knee AROM.; no reactive swelling after ex.  Strength: Right LE MMT 5/5 throughout; ascending and descending 8 in step and 6-8 in lat step down with good quad control/ control for knee valgus; Quad strength 70-80% of left with HH dynamomter testing to initiate running program and progress to Phase 5 post op activities.  Gait: Non antalgic; reciprocal stair climbing, tolerating Alter G progression by 12 weeks post op.  Function: LEFS at least 65/80, tolerating running progression in Alter G  and low level plyometrics without rebound swelling or pain, ready to transition to advanced rehab independently as insurance visits may be exhausted. (Can explore available options for continued rehab as needed).

## 2025-07-14 NOTE — PROGRESS NOTES
Subjective    Patient ID: Osmany Tapia is a 19 y.o. male.    Procedure: Right knee ACL reconstruction with patellar tendon autograft  Date of surgery: 7/8/2025    HPI:  Osmany Tapia is a 19 y.o. male presenting today, with his mother, 1 week status post right  anterior cruciate ligament reconstruction with patellar tendon autograft.  Overall, he is doing okay.  He does note a little increased pain and swelling.  He presents today for concern of a blood blister over the distal medial aspect of his incision.  Patient reports no adverse events.  Narcotic medication is complete.  He is using aspirin and SMOOTH hose for DVT prophylaxis and are compliant with use of the brace and weightbearing restrictions. Fever, chills, shortness of breath, or cough are denied. Physical therapy has started without difficulty.  They present for continued treatment recommendations.     ROS:  Constitutional: No fever, no chills, not feeling tired, no recent weight gain and no recent weight loss  ENT: No nosebleeds  Cardiovascular: No chest pain  Respiratory: No shortness of breath and no cough  Gastrointestinal: No abdominal pain, no nausea, no diarrhea, and no vomiting  Musculoskeletal: No arthralgias  Integumentary: No rashes and no skin lesions  Neurological: No headache  Psychiatric: No sleep disturbances no depression  Endocrine: No muscle weakness and no muscle cramps  Hematologic/lymphatic: No swelling glands and no tendency for easy bruising    Medical History[1]     Surgical History[2]     Current Medications[3]     RX Allergies[4]     Social Connections: Not on file        PHYSICAL EXAM:  19 y.o. male well appearing in no acute distress. Alert and oriented ×3.  Skin intact bilateral lower extremities.   Using crutches and brace. Coordination and balance intact.  Bilateral lower extremity compartments supple.  5 out of 5 distal motor strength bilaterally.  L4 through S1 sensation intact bilaterally.  2+ DP/PT pulses  bilaterally.  Right knee incisions are clean, dry and intact without signs of infection.  Moderate sized blood blister over the distal medial aspect of his tibial incision with multiple smaller ones diffusely over the medial and lateral aspect of the tibial incision. There is mild quad atrophy.  Large effusion.    ASSESSMENT/PLAN:  Status post 1 week right knee anterior cruciate ligament reconstruction with Lopez tendon autograft.     1.  Overall, he is doing okay.  We removed the postoperative Steri-Strips and upon removal, the distal medial blood blister opened.  This was extensively cleaned.  He was instructed on dressing changes twice daily.  He was provided with Xeroform, gauze, paper tape to change the dressings.  2. Continue frequent icing and elevation.  He should ice 20-30 minutes 2-3 times per day.  We agreed upon 800 mg of ibuprofen taken 3 times daily in conjunction with two 500 mg extra strength Tylenol 3 times a day as well.  3. Continue outpatient physical therapy and home exercise program, per the protocol.  Emphasize placed on quadriceps strength and stability, edema and pain control.  4. Appropriate activity and restrictions were discussed.  I highly encouraged him to elevate his foot and lower leg is much as he can.  I encouraged him to perform gentle quadriceps contractions and ankle range of motion exercises.  5. Follow up again in 1 week with Paolo Lake PA-C for suture removal.  They are in agreement the plan.  Their questions are answered.    **This office note was dictated using Dragon voice to text software and was not proofread for spelling or grammatical errors         [1]   Past Medical History:  Diagnosis Date    Asthma     childhood    Joint pain     Otitis media, unspecified, left ear 11/16/2018    Acute left otitis media    Personal history of other (healed) physical injury and trauma 11/16/2018    History of trauma    Personal history of other infectious and parasitic diseases  01/23/2019    History of viral warts   [2] No past surgical history on file.  [3]   Current Outpatient Medications:     aspirin 81 mg EC tablet, Take 1 tablet (81 mg) by mouth 2 times a day for 14 days. Do not fill before July 9, 2025., Disp: 28 tablet, Rfl: 0    docusate sodium (Colace) 100 mg capsule, Take 1 capsule (100 mg) by mouth 2 times a day for 15 days., Disp: 30 capsule, Rfl: 0    EPINEPHrine 0.3 mg/0.3 mL injection syringe, Inject 0.3 mL (0.3 mg) as directed 1 time. (Patient not taking: Reported on 7/8/2025), Disp: , Rfl:     ibuprofen 200 mg tablet, Take 1 tablet (200 mg) by mouth every 8 hours if needed for mild pain (1 - 3) or moderate pain (4 - 6)., Disp: , Rfl:     ibuprofen 800 mg tablet, Take 1 tablet (800 mg) by mouth 3 times a day for 14 days., Disp: 42 tablet, Rfl: 0    ondansetron ODT (Zofran-ODT) 4 mg disintegrating tablet, Dissolve 1 tablet (4 mg) in the mouth every 8 hours if needed for nausea or vomiting for up to 10 days., Disp: 30 tablet, Rfl: 0  [4]   Allergies  Allergen Reactions    Amoxicillin Unknown    Peanut Itching and Other     Eyelid swelling, back muscles tighten

## 2025-07-14 NOTE — TELEPHONE ENCOUNTER
Patient mom called, she stats patient knee is really aching this morning and he has PT at 10 am today. He is out of pain meds and wants to know what the best thing to give him for pain.

## 2025-07-17 ENCOUNTER — TREATMENT (OUTPATIENT)
Dept: PHYSICAL THERAPY | Facility: CLINIC | Age: 20
End: 2025-07-17
Payer: COMMERCIAL

## 2025-07-17 ENCOUNTER — APPOINTMENT (OUTPATIENT)
Dept: PHYSICAL THERAPY | Facility: CLINIC | Age: 20
End: 2025-07-17
Payer: COMMERCIAL

## 2025-07-17 DIAGNOSIS — S83.511D DISRUPTION OF ANTERIOR CRUCIATE LIGAMENT OF KNEE, RIGHT, SUBSEQUENT ENCOUNTER: ICD-10-CM

## 2025-07-17 DIAGNOSIS — R29.898 WEAKNESS OF RIGHT LOWER EXTREMITY: ICD-10-CM

## 2025-07-17 DIAGNOSIS — M25.661 DECREASED RANGE OF MOTION (ROM) OF RIGHT KNEE: ICD-10-CM

## 2025-07-17 DIAGNOSIS — S83.231D COMPLEX TEAR OF MEDIAL MENISCUS OF RIGHT KNEE, SUBSEQUENT ENCOUNTER: ICD-10-CM

## 2025-07-17 PROCEDURE — 97014 ELECTRIC STIMULATION THERAPY: CPT | Mod: GP,CQ

## 2025-07-17 PROCEDURE — 97110 THERAPEUTIC EXERCISES: CPT | Mod: GP,CQ

## 2025-07-17 ASSESSMENT — PAIN - FUNCTIONAL ASSESSMENT: PAIN_FUNCTIONAL_ASSESSMENT: 0-10

## 2025-07-17 ASSESSMENT — PAIN DESCRIPTION - DESCRIPTORS: DESCRIPTORS: ACHING;SORE;TIGHTNESS

## 2025-07-17 ASSESSMENT — PAIN SCALES - GENERAL: PAINLEVEL_OUTOF10: 4

## 2025-07-17 NOTE — PROGRESS NOTES
Physical Therapy    Physical Therapy Treatment    Patient Name: Osmany Tapia  MRN: [id number]  :2005  Today's Date: 2025    Time Entry:   Time Calculation  Start Time: 1530  Stop Time: 1638  Time Calculation (min): 68 min     PT Therapeutic Procedures Time Entry  Therapeutic Exercise Time Entry: 40  Manual Therapy Time Entry: 10  PT Modalities Time Entry  E-Stim (Unattended) Time Entry: 15 (NMES)                Visit:  3    Visit limit: 20 visit per year (2 used prior to surgery)    Assessment:   Patient presented with improved range of motion measurements since last recorded measures (STG #2 and LTG#2 addressed). Patient was able to achieve independent straight leg raise with mild quad lag. Patient reported decreased right knee pain with range of motion and weight bearing activities.     Plan:   Continue with working to improve range of motion and strength of right knee progressing as per protocol to return patient to independent gait status.     Patient RTD 2025. Continue as indicated.    Current Problem  1. Disruption of anterior cruciate ligament of knee, right, subsequent encounter  Follow Up In Physical Therapy      2. Complex tear of medial meniscus of right knee, subsequent encounter  Follow Up In Physical Therapy      3. Decreased range of motion (ROM) of right knee  Follow Up In Physical Therapy      4. Weakness of right lower extremity  Follow Up In Physical Therapy            Subjective    Patient reports right knee is stiff very stiff and continues to be unable to do a straight leg raise.     Precautions  Precautions  Precautions Comment: Per post guidelines    Pain  Pain Assessment  Pain Assessment: 0-10  0-10 (Numeric) Pain Score: 4  Pain Type: Surgical pain  Pain Location: Knee  Pain Orientation: Right  Pain Descriptors: Aching, Sore, Tightness    Objective        AAROM right knee (NuStep)  30 degrees    AAROM right knee (seated)  62 degrees    PROM right knee (seated)  65  "degrees    Supine SLR  5 degree quad lag    Proprioceptive balance right eyes opened  22 seconds        Today's Treatment:  s/p right knee ACL recon with BPTB autograft, 7/8/25  EXERCISES Date 7/14/25 Date Date Date    Visit 2 (4/20) Visit 3  5/20 Visit Visit    Post op week/ day 6 days             NMES for quad facilitation: 15 min  10 sec on/ 30 sec off  In supine 15 minutes  10 sec on/30 sec off  Long sit            Ankle pumps X 20      Calf stretch with strap 5 x 20 sec      Gluteal sets 10 x 5 sec             Supine heel slides PT assisted x 15      Supine SLR flexion:  Out of brace  In locked brace   X 10 max A  X 10 max 20 x supine Independent with unlocked brace on     Sidelying hip abduction X 15 min A, min cues             Seated heel slides in unlocked brace X 15 with PT assist             NuStep for ROM  L1 10 minutes  Started at seat 15 ended at seat 12            Seated knee flexion stretch  10-30\"H x 10 minutes            Standing SLR right only  Standing hip abduction right only  Standing ham curls right only  20 x  20 x   15 x            Proprioceptive balance eyes opened  22 seconds                   PROM right knee  10 minutes                                 Gait training:  Weightshifting  Sequencing with crutches, in locked brace and practice 15 min total  Lateral/ A/P x 5 min  10 min in department, work to increase step length, wbing, work on improved technique                    Patient Education Reviewed elevation above level of hear, with ice and getting up frequently during the day.      HEP Reviewed            Goals:  Patient Goal:  To return to PLOF; eventually return to playing soccer.   STGs to be met in 6 weeks:  Pain: Will be independent with symptom management strategies and report right knee pain no worse than 2/10 with walking, stairs, ADLs, and appropriate post op exercises.  ROM: Right knee AROM at least 0-125 deg for normal gait mechanics, reciprocal stair climbing.  Edema: " Minimal knee effusion (1+ stroke test or less) for good quad function and knee ROM.  Strength: Good quad facilitation, completing at least 10 SLRs without ext lag, controlling for knee valgus with 4in lat step down; good control on 8in step up, and quad strength at least 60% of uninvolved side for normalized gait and readiness to progress to more advanced phase of rehab.  Gait: Non antalgic gait on level surface, FWB, no assistive device, reciprocal stair ascending for improved home and community mobility.  Function: Will score at least 40/80 on LEFS; report independent function with all self care, ADLs, IADLs, and return to driving to commute to school.    LTGs to be met in 12+ weeks:   Pain: Right knee pain 0/10 with all daily activities and exercise progression.  ROM: Right knee AROM 0-140 for return to full PLOF.  Edema: Trace to no effusion for normal quad function and knee AROM.; no reactive swelling after ex.  Strength: Right LE MMT 5/5 throughout; ascending and descending 8 in step and 6-8 in lat step down with good quad control/ control for knee valgus; Quad strength 70-80% of left with HH dynamomter testing to initiate running program and progress to Phase 5 post op activities.  Gait: Non antalgic; reciprocal stair climbing, tolerating Alter G progression by 12 weeks post op.  Function: LEFS at least 65/80, tolerating running progression in Alter G  and low level plyometrics without rebound swelling or pain, ready to transition to advanced rehab independently as insurance visits may be exhausted. (Can explore available options for continued rehab as needed).

## 2025-07-21 ENCOUNTER — OFFICE VISIT (OUTPATIENT)
Dept: ORTHOPEDIC SURGERY | Facility: CLINIC | Age: 20
End: 2025-07-21
Payer: COMMERCIAL

## 2025-07-21 DIAGNOSIS — S83.511D DISRUPTION OF ANTERIOR CRUCIATE LIGAMENT OF RIGHT KNEE, SUBSEQUENT ENCOUNTER: Primary | ICD-10-CM

## 2025-07-21 PROCEDURE — 99212 OFFICE O/P EST SF 10 MIN: CPT | Performed by: PHYSICIAN ASSISTANT

## 2025-07-21 PROCEDURE — 1036F TOBACCO NON-USER: CPT | Performed by: PHYSICIAN ASSISTANT

## 2025-07-21 PROCEDURE — 99024 POSTOP FOLLOW-UP VISIT: CPT | Performed by: PHYSICIAN ASSISTANT

## 2025-07-21 ASSESSMENT — PAIN - FUNCTIONAL ASSESSMENT: PAIN_FUNCTIONAL_ASSESSMENT: 0-10

## 2025-07-21 ASSESSMENT — PAIN DESCRIPTION - DESCRIPTORS: DESCRIPTORS: ACHING

## 2025-07-21 ASSESSMENT — PAIN SCALES - GENERAL: PAINLEVEL_OUTOF10: 6

## 2025-07-21 NOTE — PROGRESS NOTES
Subjective    Patient ID: Osmany Tapia is a 19 y.o. male.    Procedure: Right knee ACL reconstruction with patella tendon autograft  Date of surgery: 7/8/2025      HPI:  Osmany Tapia is a 19 y.o. male who is 13 days out from right knee ACL reconstruction with patella tendon autograft.  He had to be seen last week due to some blisters that formed around the incision.  These have started to heal.  He feels that physical therapy is progressing.  He has been compliant with use of his brace and crutches.  He denies fever, chills, shortness of breath or cough.    ROS  Constitutional: No fever, no chills, not feeling tired, no recent weight gain and no recent weight loss  ENT: No nosebleeds  Cardiovascular: No chest pain  Respiratory: No shortness of breath and no cough  Gastrointestinal: No abdominal pain, no nausea, no diarrhea, and no vomiting  Musculoskeletal: No arthralgias  Integumentary: No rashes and no skin lesions  Neurological: No headache  Psychiatric: No sleep disturbances no depression  Endocrine: No muscle weakness and no muscle cramps  Hematologic/lymphatic: No swelling glands and no tendency for easy bruising      Objective   19-year-old male well appearing in no acute distress. Alert and oriented ×3.  Skin intact bilateral lower extremities.   Using crutches. Coordination and balance intact.  Bilateral lower extremity compartments supple.  5 out of 5 distal motor strength bilaterally.  L4 through S1 sensation intact bilaterally.  2+ DP/PT pulses bilaterally.  Right knee incisions are clean with no signs of infection.  No erythema, warmth, or drainage.  Sutures removed and Steri-Strips placed over the incisions.  He has mild ecchymosis present.  The blisters have improved.  He can perform an isometric quad contraction and straight leg raise without difficulty.  Active range of motion 0 to 30 degrees.  Negative Lachman's.  Negative Homans.        Assessment/Plan   Encounter Diagnoses:  Disruption  of anterior cruciate ligament of right knee, subsequent encounter    No orders of the defined types were placed in this encounter.      Steri-Strips were placed back over the incisions.  He can shower and get this wet but avoid submersing fully in water.  His brace was adjusted so that he could have range of motion from 0 to 50 degrees.  I would recommend setting his brace for what ever he is prior to warming up.  He may be weightbearing as tolerated, progress off crutches as soon as he is able to walk comfortably without pain or a limp.  Continue frequent icing.  He does have a hematoma that developed between the straps of his brace.  He can loosen the straps up when elevating.  He will continue with his compression stockings through the end of the week.  We will see him back in 2 weeks.    This office note was dictated using Dragon voice to text software and was not proofread for spelling or grammatical errors

## 2025-07-22 ENCOUNTER — TREATMENT (OUTPATIENT)
Dept: PHYSICAL THERAPY | Facility: CLINIC | Age: 20
End: 2025-07-22
Payer: COMMERCIAL

## 2025-07-22 DIAGNOSIS — S83.231D COMPLEX TEAR OF MEDIAL MENISCUS OF RIGHT KNEE, SUBSEQUENT ENCOUNTER: ICD-10-CM

## 2025-07-22 DIAGNOSIS — M25.661 DECREASED RANGE OF MOTION (ROM) OF RIGHT KNEE: ICD-10-CM

## 2025-07-22 DIAGNOSIS — S83.511D DISRUPTION OF ANTERIOR CRUCIATE LIGAMENT OF KNEE, RIGHT, SUBSEQUENT ENCOUNTER: ICD-10-CM

## 2025-07-22 DIAGNOSIS — R29.898 WEAKNESS OF RIGHT LOWER EXTREMITY: ICD-10-CM

## 2025-07-22 PROCEDURE — 97014 ELECTRIC STIMULATION THERAPY: CPT | Mod: GP,CQ

## 2025-07-22 PROCEDURE — 97140 MANUAL THERAPY 1/> REGIONS: CPT | Mod: GP,CQ

## 2025-07-22 PROCEDURE — 97110 THERAPEUTIC EXERCISES: CPT | Mod: GP,CQ

## 2025-07-22 ASSESSMENT — PAIN - FUNCTIONAL ASSESSMENT: PAIN_FUNCTIONAL_ASSESSMENT: 0-10

## 2025-07-22 ASSESSMENT — PAIN DESCRIPTION - DESCRIPTORS: DESCRIPTORS: TIGHTNESS;SORE;ACHING

## 2025-07-22 NOTE — PROGRESS NOTES
Physical Therapy    Physical Therapy Treatment    Patient Name: Osmany Tapia  MRN: [id number]  :2005  Today's Date: 2025    Time Entry:   Time Calculation  Start Time: 1445  Stop Time: 1542  Time Calculation (min): 57 min     PT Therapeutic Procedures Time Entry  Therapeutic Exercise Time Entry: 35  Manual Therapy Time Entry: 10  PT Modalities Time Entry  E-Stim (Unattended) Time Entry: 10 (NMES)                Visit:  4th post op visit        total visits  Visit limit: 20 visit per year (2 used prior to surgery)    Assessment:   Patient presented with improved range of motion measurements since last recorded measures (STG #2 and LTG#2 addressed). Patient was able to achieve independent straight leg raise with mild quad lag. Instructed patient in improving swing phase of gait as patient has tendency to walk with stiff right knee.     Plan:   Continue with working to improve range of motion and strength of right knee progressing as per protocol to return patient to independent gait status.     Patient RTD 2025. Continue as indicated.    Current Problem  1. Disruption of anterior cruciate ligament of knee, right, subsequent encounter  Follow Up In Physical Therapy      2. Complex tear of medial meniscus of right knee, subsequent encounter  Follow Up In Physical Therapy      3. Decreased range of motion (ROM) of right knee  Follow Up In Physical Therapy      4. Weakness of right lower extremity  Follow Up In Physical Therapy            Subjective    Patient reports right knee is stiff today. Patient indicates has sutures removed yesterday.       Precautions  Precautions  Precautions Comment: Per post guidelines    Pain  Pain Assessment  Pain Assessment: 0-10  0-10 (Numeric) Pain Score:  (4-5/10)  Pain Type: Surgical pain  Pain Location: Knee  Pain Orientation: Right  Pain Descriptors: Tightness, Sore, Aching    Objective        AAROM right knee (NuStep)  50 degrees    AAROM right knee  "(seated)  67 degrees    PROM right knee (seated)  72 degrees    Supine SLR  5 degree quad lag    Proprioceptive balance right eyes opened  60 seconds        Today's Treatment:  s/p right knee ACL recon with BPTB autograft, 7/8/25  EXERCISES Date 7/14/25 Date Date  07/22/2025 Date    Visit 2 (4/20) Visit 3  5/20 Visit  4   6/20 Visit    Post op week/ day 6 days             NMES for quad facilitation: 15 min  10 sec on/ 30 sec off  In supine 15 minutes  10 sec on/30 sec off  Long sit 10 minutes  10\" on/20\" off  Long sit           Ankle pumps X 20      Calf stretch with strap 5 x 20 sec      Gluteal sets 10 x 5 sec      SAQ   5\"H x 10    LAQ   5\"H x 15           Supine heel slides PT assisted x 15      Supine SLR flexion:  Out of brace  In locked brace   X 10 max A  X 10 max 20 x supine Independent with unlocked brace on 20x    Sidelying hip abduction X 15 min A, min cues             Seated heel slides in unlocked brace X 15 with PT assist             NuStep for ROM  L1 10 minutes  Started at seat 15 ended at seat 12 L1 10 minutes  Started at seat 13 ended at seat            Seated knee flexion stretch  10-30\"H x 10 minutes 10-30\"H x 10 minutes           Standing SLR right only  Standing hip abduction right only  Standing ham curls right only  20 x  20 x   15 x            Proprioceptive balance eyes opened  22 seconds                   PROM right knee  10 minutes 10 minutes                                Gait training:  Weightshifting  Sequencing with crutches, in locked brace and practice 15 min total  Lateral/ A/P x 5 min  10 min in department, work to increase step length, wbing, work on improved technique                    Patient Education Reviewed elevation above level of hear, with ice and getting up frequently during the day.      HEP Reviewed            Goals:  Patient Goal:  To return to PLOF; eventually return to playing soccer.   STGs to be met in 6 weeks:  Pain: Will be independent with symptom management " strategies and report right knee pain no worse than 2/10 with walking, stairs, ADLs, and appropriate post op exercises.  ROM: Right knee AROM at least 0-125 deg for normal gait mechanics, reciprocal stair climbing.  Edema: Minimal knee effusion (1+ stroke test or less) for good quad function and knee ROM.  Strength: Good quad facilitation, completing at least 10 SLRs without ext lag, controlling for knee valgus with 4in lat step down; good control on 8in step up, and quad strength at least 60% of uninvolved side for normalized gait and readiness to progress to more advanced phase of rehab.  Gait: Non antalgic gait on level surface, FWB, no assistive device, reciprocal stair ascending for improved home and community mobility.  Function: Will score at least 40/80 on LEFS; report independent function with all self care, ADLs, IADLs, and return to driving to commute to school.    LTGs to be met in 12+ weeks:   Pain: Right knee pain 0/10 with all daily activities and exercise progression.  ROM: Right knee AROM 0-140 for return to full PLOF.  Edema: Trace to no effusion for normal quad function and knee AROM.; no reactive swelling after ex.  Strength: Right LE MMT 5/5 throughout; ascending and descending 8 in step and 6-8 in lat step down with good quad control/ control for knee valgus; Quad strength 70-80% of left with HH dynamomter testing to initiate running program and progress to Phase 5 post op activities.  Gait: Non antalgic; reciprocal stair climbing, tolerating Alter G progression by 12 weeks post op.  Function: LEFS at least 65/80, tolerating running progression in Alter G  and low level plyometrics without rebound swelling or pain, ready to transition to advanced rehab independently as insurance visits may be exhausted. (Can explore available options for continued rehab as needed).

## 2025-07-28 ENCOUNTER — TREATMENT (OUTPATIENT)
Dept: PHYSICAL THERAPY | Facility: CLINIC | Age: 20
End: 2025-07-28
Payer: COMMERCIAL

## 2025-07-28 ENCOUNTER — OFFICE VISIT (OUTPATIENT)
Dept: ORTHOPEDIC SURGERY | Facility: HOSPITAL | Age: 20
End: 2025-07-28
Payer: COMMERCIAL

## 2025-07-28 DIAGNOSIS — S83.511D DISRUPTION OF ANTERIOR CRUCIATE LIGAMENT OF RIGHT KNEE, SUBSEQUENT ENCOUNTER: Primary | ICD-10-CM

## 2025-07-28 DIAGNOSIS — M25.661 DECREASED RANGE OF MOTION (ROM) OF RIGHT KNEE: ICD-10-CM

## 2025-07-28 DIAGNOSIS — S83.231D COMPLEX TEAR OF MEDIAL MENISCUS OF RIGHT KNEE, SUBSEQUENT ENCOUNTER: ICD-10-CM

## 2025-07-28 DIAGNOSIS — R29.898 WEAKNESS OF RIGHT LOWER EXTREMITY: ICD-10-CM

## 2025-07-28 DIAGNOSIS — S83.511D DISRUPTION OF ANTERIOR CRUCIATE LIGAMENT OF KNEE, RIGHT, SUBSEQUENT ENCOUNTER: ICD-10-CM

## 2025-07-28 PROCEDURE — 99024 POSTOP FOLLOW-UP VISIT: CPT | Performed by: PHYSICIAN ASSISTANT

## 2025-07-28 PROCEDURE — 1036F TOBACCO NON-USER: CPT | Performed by: PHYSICIAN ASSISTANT

## 2025-07-28 PROCEDURE — 97014 ELECTRIC STIMULATION THERAPY: CPT | Mod: GP

## 2025-07-28 PROCEDURE — 97110 THERAPEUTIC EXERCISES: CPT | Mod: GP

## 2025-07-28 PROCEDURE — 99212 OFFICE O/P EST SF 10 MIN: CPT | Performed by: PHYSICIAN ASSISTANT

## 2025-07-28 ASSESSMENT — PAIN DESCRIPTION - DESCRIPTORS: DESCRIPTORS: TIGHTNESS

## 2025-07-28 ASSESSMENT — PAIN - FUNCTIONAL ASSESSMENT: PAIN_FUNCTIONAL_ASSESSMENT: 0-10

## 2025-07-28 NOTE — PROGRESS NOTES
Physical Therapy    Physical Therapy Treatment    Patient Name: Osmany Tapia  MRN: [id number]  Today's Date: 7/28/2025    Time Entry:   Time Calculation  Start Time: 1150  Stop Time: 1255  Time Calculation (min): 65 min     PT Therapeutic Procedures Time Entry  Therapeutic Exercise Time Entry: 48  Gait Training Time Entry: 5  PT Modalities Time Entry  E-Stim (Unattended) Time Entry: 12                Assessment:   Dom is 3 weeks s/p right knee ACL with BPTB.  He is exhibiting improved quad set and now completing 30 SLRs without ext lag.  He is still reporting some bleeding from his incision from under the steri strips and he has an appt today with ortho to again look at the incision.  He is still bruised, but it is resolving. Still mild edema suprapatellarly.  Ambulating FWB in brace, brace unlocked to 70 deg today. Educated on use of flexion during swing phase and to work on better heel/ toe gait pattern.   Somewhat slow progress with obtaining knee flexion, and Benton is still somewhat guarded into flexion.  He is encouraged to continue all HEP activities including ice /elevation for edema and pain control.     Plan:   Continue to focus on good quad control, and progress ROM per post op guidelines.  Continue to encourage normalized gait mechanics on level surfaces. Will follow any other instructions given by ortho after today's visit to check incision site.    Current Problem  1. Disruption of anterior cruciate ligament of knee, right, subsequent encounter  Follow Up In Physical Therapy      2. Complex tear of medial meniscus of right knee, subsequent encounter  Follow Up In Physical Therapy      3. Decreased range of motion (ROM) of right knee  Follow Up In Physical Therapy      4. Weakness of right lower extremity  Follow Up In Physical Therapy          General     General  General Comment: Has a call in to the Dr office due to some continued bleeding from distal end of incision; may schedule a visit for them to  "check it.  Is off the crutches now; brace is unlocked but only to allow 50 deg flexion. Noticed bleeding after showering last Thursday and it continueds to bleed slightly.  Has been working on his HEP, not icing as much lately; but will resume.    Subjective    Precautions  Precautions  STEADI Fall Risk Score (The score of 4 or more indicates an increased risk of falling): 0  Precautions Comment: Per post guidelines  Pain  Pain Assessment  Pain Assessment: 0-10  0-10 (Numeric) Pain Score:  (4/10 at worst)  Pain Type: Surgical pain  Pain Location: Knee  Pain Orientation: Right  Pain Descriptors: Tightness    Objective   Steri strips in place with apparently heavy scabbing underneath, and Osmany has additional bandage over distal incision due to continued bleeding through.   Achieved 78 deg knee flexion after all AA/PROM today.   Good quad set.  SLR without ext lag.  Bruising is slowly resolving.  Grade 2 brush test for pre patellar edema.  Ambulating FWB in unlocked brace; but still with quite stiff knee; minimal flexion during swing phase.       Today's Treatment:  s/p right knee ACL recon with BPTB autograft, 7/8/25  EXERCISES Date 7/14/25 Date Date  07/22/2025 Date 7/28/25    Visit 2 (4/20) Visit 3  5/20 Visit  4   6/20 Visit 5   7/20   Post op week/ day 6 days             NMES for quad facilitation: 15 min  10 sec on/ 30 sec off  In supine 15 minutes  10 sec on/30 sec off  Long sit 10 minutes  10\" on/20\" off  Long sit 12 min  10\" on/ 20\" off  Long Sitting          Ankle pumps X 20      Calf stretch with strap 5 x 20 sec      Gluteal sets 10 x 5 sec      SAQ   5\"H x 10    LAQ   5\"H x 15           Supine heel slides PT assisted x 15      Supine SLR flexion:  Out of brace  In locked brace   X 10 max A  X 10 max 20 x supine Independent with unlocked brace on 20x   30x no ext lag   Sidelying hip abduction X 15 min A, min cues   home          Seated heel slides in unlocked brace X 15 with PT assist   Out of brace " "  With assist 15 x           NuStep for ROM (1st)  L1 10 minutes  Started at seat 15 ended at seat 12 L1 10 minutes  Started at seat 13 ended at seat   L2 10 minutes  Started at seat 14 ended at seat 10   Airex for ROM (2nd)    8 min   Seated knee flexion stretch  10-30\"H x 10 minutes 10-30\"H x 10 minutes Dangle over eob x 5 min          Standing SLR right only  Standing hip abduction right only  Standing ham curls right only  20 x  20 x   15 x   See multi hip          Proprioceptive balance eyes opened  22 seconds            Multi Hip:  Abduction  Extension  TKE      30# 3 x 20 Right  30# 3 x 10 Right  100# 20 x 5 sec    PROM right knee  10 minutes 10 minutes                                Gait training:  Weightshifting  Sequencing with crutches, in locked brace and practice 15 min total  Lateral/ A/P x 5 min  10 min in department, work to increase step length, wbing, work on improved technique   Reviewed heel/toe pattern working on knee flexion during swing phase with unlocked brace.   Opened brace to 70 deg. Flexion.                 Patient Education Reviewed elevation above level of hear, with ice and getting up frequently during the day.   Reviewed HEP; encouraged focus on knee flexion ROM; dangle over EOB, heel slides.   HEP Reviewed            Goals:  Patient Goal:  To return to PLOF; eventually return to playing soccer.   STGs to be met in 6 weeks:  Pain: Will be independent with symptom management strategies and report right knee pain no worse than 2/10 with walking, stairs, ADLs, and appropriate post op exercises.  ROM: Right knee AROM at least 0-125 deg for normal gait mechanics, reciprocal stair climbing.  Edema: Minimal knee effusion (1+ stroke test or less) for good quad function and knee ROM.  Strength: Good quad facilitation, completing at least 10 SLRs without ext lag, controlling for knee valgus with 4in lat step down; good control on 8in step up, and quad strength at least 60% of uninvolved side " for normalized gait and readiness to progress to more advanced phase of rehab.  Gait: Non antalgic gait on level surface, FWB, no assistive device, reciprocal stair ascending for improved home and community mobility.  Function: Will score at least 40/80 on LEFS; report independent function with all self care, ADLs, IADLs, and return to driving to commute to school.    LTGs to be met in 12+ weeks:   Pain: Right knee pain 0/10 with all daily activities and exercise progression.  ROM: Right knee AROM 0-140 for return to full PLOF.  Edema: Trace to no effusion for normal quad function and knee AROM.; no reactive swelling after ex.  Strength: Right LE MMT 5/5 throughout; ascending and descending 8 in step and 6-8 in lat step down with good quad control/ control for knee valgus; Quad strength 70-80% of left with HH dynamomter testing to initiate running program and progress to Phase 5 post op activities.  Gait: Non antalgic; reciprocal stair climbing, tolerating Alter G progression by 12 weeks post op.  Function: LEFS at least 65/80, tolerating running progression in Alter G  and low level plyometrics without rebound swelling or pain, ready to transition to advanced rehab independently as insurance visits may be exhausted. (Can explore available options for continued rehab as needed).

## 2025-07-28 NOTE — PROGRESS NOTES
Subjective    Patient ID: Osmany Tapia is a 19 y.o. male.    Procedure: Right knee ACL reconstruction with patella tendon autograft  Date of surgery: 7/8/2025      HPI:  Osmany Tapia is a 19 y.o. male who is 3 weeks out from right knee ACL reconstruction with patella tendon autograft.  He contacted the office as he was having a little bit of increased bleeding at the most distal aspect of the harvest site incision.  He has really been working hard in physical therapy with his motion.  He states that he is up to 78 degrees of flexion today.  He has not noticed any significant redness around the incision.  He denies any fever or chills.    ROS  Constitutional: No fever, no chills, not feeling tired, no recent weight gain and no recent weight loss  ENT: No nosebleeds  Cardiovascular: No chest pain  Respiratory: No shortness of breath and no cough  Gastrointestinal: No abdominal pain, no nausea, no diarrhea, and no vomiting  Musculoskeletal: No arthralgias  Integumentary: No rashes and no skin lesions  Neurological: No headache  Psychiatric: No sleep disturbances no depression  Endocrine: No muscle weakness and no muscle cramps  Hematologic/lymphatic: No swelling glands and no tendency for easy bruising      Objective   19-year-old male well appearing in no acute distress. Alert and oriented ×3.  Skin intact bilateral lower extremities.   Normal tandem gait. Coordination and balance intact.  Bilateral lower extremity compartments supple.  5 out of 5 distal motor strength bilaterally.  L4 through S1 sensation intact bilaterally.  2+ DP/PT pulses bilaterally.  The most distal aspect of the harvest site incision is slightly open.  There is some coagulated blood in this area but no active drainage.  No surrounding erythema.  The blisters that were surrounding the incision have healed over.  The remainder of the incision as well as the arthroscopic portals are clean.  No significant joint effusion.  No warmth or  erythema.      Assessment/Plan   Encounter Diagnoses:  Disruption of anterior cruciate ligament of right knee, subsequent encounter    No orders of the defined types were placed in this encounter.      The incision was redressed today with new Steri-Strips.  He should try to leave these in place.  Continue to keep the incision dry.  He can change the bandages out as needed.  Will have him cancel physical therapy this week and hold off on doing significant motion exercises.  At home he can continue with his straight leg raises and quad sets.  His brace was reset today so that he may have range of motion from 0 to 40 degrees.  We will see him back again in 1 week for an incision check.    This office note was dictated using Dragon voice to text software and was not proofread for spelling or grammatical errors

## 2025-07-31 ENCOUNTER — APPOINTMENT (OUTPATIENT)
Dept: PHYSICAL THERAPY | Facility: CLINIC | Age: 20
End: 2025-07-31
Payer: COMMERCIAL

## 2025-08-04 ENCOUNTER — TREATMENT (OUTPATIENT)
Dept: PHYSICAL THERAPY | Facility: CLINIC | Age: 20
End: 2025-08-04
Payer: COMMERCIAL

## 2025-08-04 ENCOUNTER — OFFICE VISIT (OUTPATIENT)
Dept: ORTHOPEDIC SURGERY | Facility: CLINIC | Age: 20
End: 2025-08-04
Payer: COMMERCIAL

## 2025-08-04 DIAGNOSIS — S83.511D DISRUPTION OF ANTERIOR CRUCIATE LIGAMENT OF KNEE, RIGHT, SUBSEQUENT ENCOUNTER: ICD-10-CM

## 2025-08-04 DIAGNOSIS — R29.898 WEAKNESS OF RIGHT LOWER EXTREMITY: ICD-10-CM

## 2025-08-04 DIAGNOSIS — S83.511D DISRUPTION OF ANTERIOR CRUCIATE LIGAMENT OF RIGHT KNEE, SUBSEQUENT ENCOUNTER: Primary | ICD-10-CM

## 2025-08-04 DIAGNOSIS — M25.661 DECREASED RANGE OF MOTION (ROM) OF RIGHT KNEE: ICD-10-CM

## 2025-08-04 DIAGNOSIS — S83.231D COMPLEX TEAR OF MEDIAL MENISCUS OF RIGHT KNEE, SUBSEQUENT ENCOUNTER: ICD-10-CM

## 2025-08-04 PROCEDURE — 99212 OFFICE O/P EST SF 10 MIN: CPT

## 2025-08-04 PROCEDURE — 99024 POSTOP FOLLOW-UP VISIT: CPT | Performed by: PHYSICIAN ASSISTANT

## 2025-08-04 PROCEDURE — 1036F TOBACCO NON-USER: CPT | Performed by: PHYSICIAN ASSISTANT

## 2025-08-04 PROCEDURE — 97110 THERAPEUTIC EXERCISES: CPT | Mod: GP

## 2025-08-04 PROCEDURE — 97014 ELECTRIC STIMULATION THERAPY: CPT | Mod: GP

## 2025-08-04 ASSESSMENT — PAIN SCALES - GENERAL
PAINLEVEL_OUTOF10: 0 - NO PAIN
PAINLEVEL_OUTOF10: 2

## 2025-08-04 ASSESSMENT — PAIN DESCRIPTION - DESCRIPTORS: DESCRIPTORS: TIGHTNESS

## 2025-08-04 ASSESSMENT — PAIN - FUNCTIONAL ASSESSMENT
PAIN_FUNCTIONAL_ASSESSMENT: 0-10
PAIN_FUNCTIONAL_ASSESSMENT: 0-10

## 2025-08-04 NOTE — PROGRESS NOTES
Physical Therapy    Physical Therapy Treatment    Patient Name: Osmany Tapia  MRN: [id number]  Today's Date: 8/4/2025    Time Entry:   Time Calculation  Start Time: 1020  Stop Time: 1125  Time Calculation (min): 65 min     PT Therapeutic Procedures Time Entry  Therapeutic Exercise Time Entry: 40  Manual Therapy Time Entry: 10  PT Modalities Time Entry  E-Stim (Unattended) Time Entry: 12                Assessment:   Osmany completed today's treatment with some difficulty; but overall improved knee flexion ROM and improved quad control on beginning double leg and single leg CKC ex (shuttle, standing TKE).  Ambulating FWB; brace unlocked but only to 40 deg due to some incision issues.  He is encouraged to continue with all HEP activities and we added seated low load long duration knee ext stretch (foot propped on ottoman).     Plan:   Continue to progress per post op guidelines and based on any additional input from ISAAC Blue or Dr Gaines.     Current Problem  1. Disruption of anterior cruciate ligament of knee, right, subsequent encounter  Follow Up In Physical Therapy      2. Complex tear of medial meniscus of right knee, subsequent encounter  Follow Up In Physical Therapy      3. Decreased range of motion (ROM) of right knee  Follow Up In Physical Therapy      4. Weakness of right lower extremity  Follow Up In Physical Therapy          General     General  General Comment: Saw ISAAC Blue last week due to some continued bleeding from his incision.  He was told to hold off on his second PT visit last week; keep brace at 40 deg flexion to allow for incision to fully close.  He will see Paolo again today for follow up.  His knee feels good; not really having any pain.  Has been doing his ex at home.    Subjective    Precautions  Precautions  STEADI Fall Risk Score (The score of 4 or more indicates an increased risk of falling): 0  Precautions Comment: Per post guidelines    Pain  Pain Assessment  Pain Assessment:  "0-10  0-10 (Numeric) Pain Score: 0 - No pain  Pain Type: Surgical pain  Pain Location: Knee  Pain Orientation: Right  Pain Descriptors: Tightness    Objective   Incision still with steri strips in place distally.  Pin point area of fresh blood noted over expose area; covered with bandaid today and no worse with today's treatment.    AA knee flexion to 82 deg today  Good quad set; improved with NMES  Achieves full ext with quad set in long sitting and passively      Today's Treatment:  s/p right knee ACL recon with BPTB autograft, 7/8/25  EXERCISES Date  07/22/2025 Date 7/28/25 Date: 8/4/25    Visit  4   6/20 Visit 5   7/20 Visit 6    8/20   Post op week/ day   4 weeks         Nustep for ROM L1 10 minutes  Started at seat 13 ended at seat  L2 10 minutes  Started at seat 14 ended at seat 10 L3 x 10 min total  Seat 13 to seat 10   Airex for ROM  8 min (pendulum) Seat 5  5 min (pendulum)         NMES for quad facilitation: 10 minutes  10\" on/20\" off  Long sit 12 min  10\" on/ 20\" off  Long Sitting 12 min  10\" on/ 2-\" off  Long sitting               Calf stretch with strap      SAQ 5\"H x 10     LAQ 5\"H x 15           Supine heel slides      Supine SLR flexion:  Out of brace  In locked brace 20x   30x no ext lag   3 x 10   Sidelying hip abduction  home          Seated heel slides in unlocked brace  Out of brace   With assist 15 x  X 10  (Reviewed for home)         Seated knee flexion stretch 10-30\"H x 10 minutes Dangle over eob x 5 min (Home)               Proprioceptive balance eyes opened   Rockerboard A/P x 1 min         Multi Hip:  Abduction  Extension  TKE    30# 3 x 20 Right  30# 3 x 10 Right  100# 20 x 5 sec    30# 3 x 20 Right  30# 3 x 10 Right  100# 20 x 5 sec          Shuttle:  DLP  SLP  DHR     4B x 20  2B 2 x 10 Right  3B x 20         Manual:  PROM right knee   10 minutes   10 min    Patellar mobs  Passive knee ext with overpressure (PT)         Side stepping (progress to band walks)   Next---->             "   Gait training:  Weightshifting  Sequencing with crutches, in locked brace and practice  Reviewed heel/toe pattern working on knee flexion during swing phase with unlocked brace.   Opened brace to 70 deg. Flexion. Ambulating FWB; unlocked brace to 40 deg today per Paolo Lake PA-C; open up as ROM, quad control, incision closure allows               Patient Education  Reviewed HEP; encouraged focus on knee flexion ROM; dangle over EOB, heel slides. Reviewed HEP; encouraged low load long duraction knee ext stretching as well as all other ex   Ice   Will ice at home   HEP            Goals:  Patient Goal:  To return to PLOF; eventually return to playing soccer.   STGs to be met in 6 weeks:  Pain: Will be independent with symptom management strategies and report right knee pain no worse than 2/10 with walking, stairs, ADLs, and appropriate post op exercises.  ROM: Right knee AROM at least 0-125 deg for normal gait mechanics, reciprocal stair climbing.  Edema: Minimal knee effusion (1+ stroke test or less) for good quad function and knee ROM.  Strength: Good quad facilitation, completing at least 10 SLRs without ext lag, controlling for knee valgus with 4in lat step down; good control on 8in step up, and quad strength at least 60% of uninvolved side for normalized gait and readiness to progress to more advanced phase of rehab.  Gait: Non antalgic gait on level surface, FWB, no assistive device, reciprocal stair ascending for improved home and community mobility.  Function: Will score at least 40/80 on LEFS; report independent function with all self care, ADLs, IADLs, and return to driving to commute to school.    LTGs to be met in 12+ weeks:   Pain: Right knee pain 0/10 with all daily activities and exercise progression.  ROM: Right knee AROM 0-140 for return to full PLOF.  Edema: Trace to no effusion for normal quad function and knee AROM.; no reactive swelling after ex.  Strength: Right LE MMT 5/5 throughout; ascending  and descending 8 in step and 6-8 in lat step down with good quad control/ control for knee valgus; Quad strength 70-80% of left with HH dynamomter testing to initiate running program and progress to Phase 5 post op activities.  Gait: Non antalgic; reciprocal stair climbing, tolerating Alter G progression by 12 weeks post op.  Function: LEFS at least 65/80, tolerating running progression in Alter G  and low level plyometrics without rebound swelling or pain, ready to transition to advanced rehab independently as insurance visits may be exhausted. (Can explore available options for continued rehab as needed).

## 2025-08-04 NOTE — PROGRESS NOTES
Subjective    Patient ID: Osmany Tapia is a 19 y.o. male.    Procedure: Right knee ACL reconstruction with patella tendon autograft  Date of surgery: 7/8/2025      HPI:  Osmany Tapia is a 19 y.o. male who is 4 weeks out from right knee ACL reconstruction with patella tendon autograft.  He was having a little bit of an issue with wound healing.  He held on physical therapy last week and symptoms have improved.  Today he reports only minimal bleeding on the bandages.  He has returned back to physical therapy.  He reports minimal pain and discomfort.    ROS  Constitutional: No fever, no chills, not feeling tired, no recent weight gain and no recent weight loss  ENT: No nosebleeds  Cardiovascular: No chest pain  Respiratory: No shortness of breath and no cough  Gastrointestinal: No abdominal pain, no nausea, no diarrhea, and no vomiting  Musculoskeletal: No arthralgias  Integumentary: No rashes and no skin lesions  Neurological: No headache  Psychiatric: No sleep disturbances no depression  Endocrine: No muscle weakness and no muscle cramps  Hematologic/lymphatic: No swelling glands and no tendency for easy bruising      Objective   19-year-old male well appearing in no acute distress. Alert and oriented ×3.  Skin intact bilateral lower extremities.   Normal tandem gait. Coordination and balance intact.  Bilateral lower extremity compartments supple.  5 out of 5 distal motor strength bilaterally.  L4 through S1 sensation intact bilaterally.  2+ DP/PT pulses bilaterally.  Right knee incisions are clean with no signs of infection.  No erythema or warmth.  The most distal aspect of the harvest incision is starting to fill in with granulation tissue.  Trace effusion.  Active range of motion 0 to 80 degrees.  Negative Lachman's.      Assessment/Plan   Encounter Diagnoses:  Disruption of anterior cruciate ligament of right knee, subsequent encounter    No orders of the defined types were placed in this  encounter.      He is going to continue with physical therapy.  We discussed performing patella mobility throughout the day.  He can keep the incisions covered with Band-Aids.  Continue frequent icing.  He does not need to use the brace when resting and sleeping but should continue it when ambulatory for the next 2 weeks.  He will keep a close eye on the incisions let us know if there are any issues.  We will see him back again in 4 weeks.    This office note was dictated using Dragon voice to text software and was not proofread for spelling or grammatical errors

## 2025-08-06 ENCOUNTER — APPOINTMENT (OUTPATIENT)
Dept: ORTHOPEDIC SURGERY | Facility: HOSPITAL | Age: 20
End: 2025-08-06
Payer: COMMERCIAL

## 2025-08-07 ENCOUNTER — TREATMENT (OUTPATIENT)
Dept: PHYSICAL THERAPY | Facility: CLINIC | Age: 20
End: 2025-08-07
Payer: COMMERCIAL

## 2025-08-07 DIAGNOSIS — R29.898 WEAKNESS OF RIGHT LOWER EXTREMITY: ICD-10-CM

## 2025-08-07 DIAGNOSIS — M25.661 DECREASED RANGE OF MOTION (ROM) OF RIGHT KNEE: ICD-10-CM

## 2025-08-07 DIAGNOSIS — R26.9 GAIT DIFFICULTY: ICD-10-CM

## 2025-08-07 DIAGNOSIS — S83.231D COMPLEX TEAR OF MEDIAL MENISCUS OF RIGHT KNEE, SUBSEQUENT ENCOUNTER: ICD-10-CM

## 2025-08-07 DIAGNOSIS — S83.511D DISRUPTION OF ANTERIOR CRUCIATE LIGAMENT OF KNEE, RIGHT, SUBSEQUENT ENCOUNTER: Primary | ICD-10-CM

## 2025-08-07 PROCEDURE — 97110 THERAPEUTIC EXERCISES: CPT | Mod: GP,CQ

## 2025-08-07 ASSESSMENT — PAIN SCALES - GENERAL: PAINLEVEL_OUTOF10: 0 - NO PAIN

## 2025-08-07 ASSESSMENT — PAIN DESCRIPTION - DESCRIPTORS: DESCRIPTORS: TIGHTNESS

## 2025-08-07 ASSESSMENT — PAIN - FUNCTIONAL ASSESSMENT: PAIN_FUNCTIONAL_ASSESSMENT: 0-10

## 2025-08-07 NOTE — PROGRESS NOTES
Physical Therapy    Physical Therapy Treatment    Patient Name: Osmany Tapia  MRN: [id number]  :2005  Today's Date: 2025    Time Entry:   Time Calculation  Start Time: 1145  Stop Time: 1245  Time Calculation (min): 60 min  PT Therapeutic Procedures Time Entry  Therapeutic Exercise Time Entry: 40  Manual Therapy Time Entry: 10     Non-Billable Time  Non-billable time: 10  Non-billable time reason: Cold pack after treatment    Visit:   total visits used  Visit limit: 20 visit per year (2 used prior to surgery)    Assessment:   Patient tolerated increases in exercise program without complaints of increased right knee or lower extremity symptoms. Patient presented with improved range of motion measurements since last recorded measures (STG #2 addressed). Patient continues to present with drainage of distal incision site throughout treatment requiring multiple (2x) dressing changes.     Plan:   Continue with working to improve range of motion and strength of right knee progressing as per protocol to return patient to independent gait status.     Patient RTD 2025.    Current Problem  1. Disruption of anterior cruciate ligament of knee, right, subsequent encounter  Follow Up In Physical Therapy      2. Complex tear of medial meniscus of right knee, subsequent encounter  Follow Up In Physical Therapy      3. Decreased range of motion (ROM) of right knee  Follow Up In Physical Therapy      4. Weakness of right lower extremity  Follow Up In Physical Therapy      5. Gait difficulty            Subjective    Patient RTD on Monday and is cleared from wearing brace at home and with sleeping. Patient indicates continues to have to wear brace for community ambulation. Patient also cleared to drive.    Precautions  Precautions  Precautions Comment: Per post guidelines    Pain  Pain Assessment  Pain Assessment: 0-10  0-10 (Numeric) Pain Score: 0 - No pain  Pain Type: Surgical pain  Pain Location:  "Knee  Pain Orientation: Right  Pain Descriptors: Tightness    Objective   PROM right knee   0 - 95 degrees    AROM right knee after PROM  3 - 90 degrees      Today's Treatment:  s/p right knee ACL recon with BPTB autograft, 7/8/25  EXERCISES Date  07/22/2025 Date 7/28/25 Date: 8/4/25 Date 08/07/2025    Visit  4   6/20 Visit 5   7/20 Visit 6    8/20 Visit 7  9/20   Post op week/ day   4 weeks           Nustep for ROM L1 10 minutes  Started at seat 13 ended at seat  L2 10 minutes  Started at seat 14 ended at seat 10 L3 x 10 min total  Seat 13 to seat 10    Airex for ROM  8 min (pendulum) Seat 5  5 min (pendulum) S5 10 minutes          NMES for quad facilitation: 10 minutes  10\" on/20\" off  Long sit 12 min  10\" on/ 20\" off  Long Sitting 12 min  10\" on/ 2-\" off  Long sitting                  Calf stretch with strap       SAQ 5\"H x 10      LAQ 5\"H x 15             Supine heel slides       Supine SLR flexion:  Out of brace  In locked brace 20x   30x no ext lag   3 x 10   3 x 10   Sidelying hip abduction  home            Seated heel slides in unlocked brace  Out of brace   With assist 15 x  X 10  (Reviewed for home) 10 x          Seated knee flexion stretch 10-30\"H x 10 minutes Dangle over eob x 5 min (Home)                  Proprioceptive balance eyes opened   Rockerboard A/P x 1 min Rockerboard x 1 min each          Multi Hip:  Flexion  Abduction  Extension  TKE      30# 3 x 20 Right  30# 3 x 10 Right  100# 20 x 5 sec      30# 3 x 20 Right  30# 3 x 10 Right  100# 20 x 5 sec    60# x 20 each  60# x 20 each  --------  120# x 20          Shuttle:  DLP  SLP  DHR     4B x 20  2B 2 x 10 Right  3B x 20   6B x 20  4B 2 x 10  6B x 20          Manual:  PROM right knee   10 minutes   10 min    Patellar mobs  Passive knee ext with overpressure (PT) 10 minutes          Side stepping (progress to band walks)   Next---->                  Gait training:  Weightshifting  Sequencing with crutches, in locked brace and practice  Reviewed " heel/toe pattern working on knee flexion during swing phase with unlocked brace.   Opened brace to 70 deg. Flexion. Ambulating FWB; unlocked brace to 40 deg today per [place] PA-C; open up as ROM, quad control, incision closure allows Gait with unlocked brace to 80 degrees                 Patient Education  Reviewed HEP; encouraged focus on knee flexion ROM; dangle over EOB, heel slides. Reviewed HEP; encouraged low load long duraction knee ext stretching as well as all other ex    Ice   Will ice at home 10 minutes   HEP             Goals:  Patient Goal:  To return to PLOF; eventually return to playing soccer.   STGs to be met in 6 weeks:  Pain: Will be independent with symptom management strategies and report right knee pain no worse than 2/10 with walking, stairs, ADLs, and appropriate post op exercises.  ROM: Right knee AROM at least 0-125 deg for normal gait mechanics, reciprocal stair climbing.  Edema: Minimal knee effusion (1+ stroke test or less) for good quad function and knee ROM.  Strength: Good quad facilitation, completing at least 10 SLRs without ext lag, controlling for knee valgus with 4in lat step down; good control on 8in step up, and quad strength at least 60% of uninvolved side for normalized gait and readiness to progress to more advanced phase of rehab.  Gait: Non antalgic gait on level surface, FWB, no assistive device, reciprocal stair ascending for improved home and community mobility.  Function: Will score at least 40/80 on LEFS; report independent function with all self care, ADLs, IADLs, and return to driving to commute to school.    LTGs to be met in 12+ weeks:   Pain: Right knee pain 0/10 with all daily activities and exercise progression.  ROM: Right knee AROM 0-140 for return to full PLOF.  Edema: Trace to no effusion for normal quad function and knee AROM.; no reactive swelling after ex.  Strength: Right LE MMT 5/5 throughout; ascending and descending 8 in step and 6-8 in lat step  down with good quad control/ control for knee valgus; Quad strength 70-80% of left with HH dynamomter testing to initiate running program and progress to Phase 5 post op activities.  Gait: Non antalgic; reciprocal stair climbing, tolerating Alter G progression by 12 weeks post op.  Function: LEFS at least 65/80, tolerating running progression in Alter G  and low level plyometrics without rebound swelling or pain, ready to transition to advanced rehab independently as insurance visits may be exhausted. (Can explore available options for continued rehab as needed).

## 2025-08-11 ENCOUNTER — TREATMENT (OUTPATIENT)
Dept: PHYSICAL THERAPY | Facility: CLINIC | Age: 20
End: 2025-08-11
Payer: COMMERCIAL

## 2025-08-11 DIAGNOSIS — S83.511D DISRUPTION OF ANTERIOR CRUCIATE LIGAMENT OF KNEE, RIGHT, SUBSEQUENT ENCOUNTER: ICD-10-CM

## 2025-08-11 DIAGNOSIS — S83.231D COMPLEX TEAR OF MEDIAL MENISCUS OF RIGHT KNEE, SUBSEQUENT ENCOUNTER: ICD-10-CM

## 2025-08-11 DIAGNOSIS — M25.661 DECREASED RANGE OF MOTION (ROM) OF RIGHT KNEE: Primary | ICD-10-CM

## 2025-08-11 DIAGNOSIS — R29.898 WEAKNESS OF RIGHT LOWER EXTREMITY: ICD-10-CM

## 2025-08-11 PROCEDURE — 97110 THERAPEUTIC EXERCISES: CPT | Mod: GP

## 2025-08-11 ASSESSMENT — PAIN - FUNCTIONAL ASSESSMENT: PAIN_FUNCTIONAL_ASSESSMENT: 0-10

## 2025-08-11 ASSESSMENT — PAIN DESCRIPTION - DESCRIPTORS: DESCRIPTORS: TIGHTNESS

## 2025-08-13 ENCOUNTER — TREATMENT (OUTPATIENT)
Dept: PHYSICAL THERAPY | Facility: CLINIC | Age: 20
End: 2025-08-13
Payer: COMMERCIAL

## 2025-08-13 DIAGNOSIS — S83.511D DISRUPTION OF ANTERIOR CRUCIATE LIGAMENT OF KNEE, RIGHT, SUBSEQUENT ENCOUNTER: ICD-10-CM

## 2025-08-13 DIAGNOSIS — M25.661 DECREASED RANGE OF MOTION (ROM) OF RIGHT KNEE: ICD-10-CM

## 2025-08-13 DIAGNOSIS — R29.898 WEAKNESS OF RIGHT LOWER EXTREMITY: ICD-10-CM

## 2025-08-13 DIAGNOSIS — S83.231D COMPLEX TEAR OF MEDIAL MENISCUS OF RIGHT KNEE, SUBSEQUENT ENCOUNTER: ICD-10-CM

## 2025-08-13 PROCEDURE — 97110 THERAPEUTIC EXERCISES: CPT | Mod: GP,CQ

## 2025-08-13 ASSESSMENT — PAIN - FUNCTIONAL ASSESSMENT: PAIN_FUNCTIONAL_ASSESSMENT: 0-10

## 2025-08-13 ASSESSMENT — PAIN SCALES - GENERAL: PAINLEVEL_OUTOF10: 1

## 2025-08-13 ASSESSMENT — PAIN DESCRIPTION - DESCRIPTORS: DESCRIPTORS: DULL;TIGHTNESS

## 2025-08-18 ENCOUNTER — TREATMENT (OUTPATIENT)
Dept: PHYSICAL THERAPY | Facility: CLINIC | Age: 20
End: 2025-08-18
Payer: COMMERCIAL

## 2025-08-18 DIAGNOSIS — R29.898 WEAKNESS OF RIGHT LOWER EXTREMITY: ICD-10-CM

## 2025-08-18 DIAGNOSIS — S83.511D DISRUPTION OF ANTERIOR CRUCIATE LIGAMENT OF KNEE, RIGHT, SUBSEQUENT ENCOUNTER: ICD-10-CM

## 2025-08-18 DIAGNOSIS — S83.231D COMPLEX TEAR OF MEDIAL MENISCUS OF RIGHT KNEE, SUBSEQUENT ENCOUNTER: ICD-10-CM

## 2025-08-18 DIAGNOSIS — M25.661 DECREASED RANGE OF MOTION (ROM) OF RIGHT KNEE: ICD-10-CM

## 2025-08-18 PROCEDURE — 97110 THERAPEUTIC EXERCISES: CPT | Mod: GP

## 2025-08-18 ASSESSMENT — PAIN SCALES - GENERAL: PAINLEVEL_OUTOF10: 0 - NO PAIN

## 2025-08-18 ASSESSMENT — PAIN - FUNCTIONAL ASSESSMENT: PAIN_FUNCTIONAL_ASSESSMENT: 0-10

## 2025-08-21 ENCOUNTER — TREATMENT (OUTPATIENT)
Dept: PHYSICAL THERAPY | Facility: CLINIC | Age: 20
End: 2025-08-21
Payer: COMMERCIAL

## 2025-08-21 DIAGNOSIS — S83.511D DISRUPTION OF ANTERIOR CRUCIATE LIGAMENT OF KNEE, RIGHT, SUBSEQUENT ENCOUNTER: ICD-10-CM

## 2025-08-21 DIAGNOSIS — M25.661 DECREASED RANGE OF MOTION (ROM) OF RIGHT KNEE: ICD-10-CM

## 2025-08-21 DIAGNOSIS — R29.898 WEAKNESS OF RIGHT LOWER EXTREMITY: ICD-10-CM

## 2025-08-21 DIAGNOSIS — S83.231D COMPLEX TEAR OF MEDIAL MENISCUS OF RIGHT KNEE, SUBSEQUENT ENCOUNTER: ICD-10-CM

## 2025-08-21 PROCEDURE — 97110 THERAPEUTIC EXERCISES: CPT | Mod: GP,CQ

## 2025-08-21 ASSESSMENT — PAIN - FUNCTIONAL ASSESSMENT: PAIN_FUNCTIONAL_ASSESSMENT: 0-10

## 2025-08-21 ASSESSMENT — PAIN SCALES - GENERAL: PAINLEVEL_OUTOF10: 0 - NO PAIN

## 2025-08-25 ENCOUNTER — TREATMENT (OUTPATIENT)
Dept: PHYSICAL THERAPY | Facility: CLINIC | Age: 20
End: 2025-08-25
Payer: COMMERCIAL

## 2025-08-25 DIAGNOSIS — M25.661 DECREASED RANGE OF MOTION (ROM) OF RIGHT KNEE: ICD-10-CM

## 2025-08-25 DIAGNOSIS — S83.231D COMPLEX TEAR OF MEDIAL MENISCUS OF RIGHT KNEE, SUBSEQUENT ENCOUNTER: ICD-10-CM

## 2025-08-25 DIAGNOSIS — S83.511D DISRUPTION OF ANTERIOR CRUCIATE LIGAMENT OF KNEE, RIGHT, SUBSEQUENT ENCOUNTER: ICD-10-CM

## 2025-08-25 DIAGNOSIS — R29.898 WEAKNESS OF RIGHT LOWER EXTREMITY: ICD-10-CM

## 2025-08-25 PROCEDURE — 97110 THERAPEUTIC EXERCISES: CPT | Mod: GP

## 2025-08-25 ASSESSMENT — PAIN SCALES - GENERAL: PAINLEVEL_OUTOF10: 0 - NO PAIN

## 2025-08-25 ASSESSMENT — PAIN - FUNCTIONAL ASSESSMENT: PAIN_FUNCTIONAL_ASSESSMENT: 0-10

## 2025-08-27 ENCOUNTER — TREATMENT (OUTPATIENT)
Dept: PHYSICAL THERAPY | Facility: CLINIC | Age: 20
End: 2025-08-27
Payer: COMMERCIAL

## 2025-08-27 DIAGNOSIS — M25.661 DECREASED RANGE OF MOTION (ROM) OF RIGHT KNEE: ICD-10-CM

## 2025-08-27 DIAGNOSIS — S83.511D DISRUPTION OF ANTERIOR CRUCIATE LIGAMENT OF KNEE, RIGHT, SUBSEQUENT ENCOUNTER: ICD-10-CM

## 2025-08-27 DIAGNOSIS — S83.231D COMPLEX TEAR OF MEDIAL MENISCUS OF RIGHT KNEE, SUBSEQUENT ENCOUNTER: ICD-10-CM

## 2025-08-27 DIAGNOSIS — R29.898 WEAKNESS OF RIGHT LOWER EXTREMITY: ICD-10-CM

## 2025-08-27 PROCEDURE — 97110 THERAPEUTIC EXERCISES: CPT | Mod: GP,CQ

## 2025-08-27 ASSESSMENT — PAIN - FUNCTIONAL ASSESSMENT: PAIN_FUNCTIONAL_ASSESSMENT: 0-10

## 2025-08-27 ASSESSMENT — PAIN SCALES - GENERAL: PAINLEVEL_OUTOF10: 0 - NO PAIN

## 2025-09-03 ENCOUNTER — TREATMENT (OUTPATIENT)
Dept: PHYSICAL THERAPY | Facility: CLINIC | Age: 20
End: 2025-09-03
Payer: COMMERCIAL

## 2025-09-03 DIAGNOSIS — S83.231D COMPLEX TEAR OF MEDIAL MENISCUS OF RIGHT KNEE, SUBSEQUENT ENCOUNTER: ICD-10-CM

## 2025-09-03 DIAGNOSIS — R29.898 WEAKNESS OF RIGHT LOWER EXTREMITY: ICD-10-CM

## 2025-09-03 DIAGNOSIS — S83.511D DISRUPTION OF ANTERIOR CRUCIATE LIGAMENT OF KNEE, RIGHT, SUBSEQUENT ENCOUNTER: ICD-10-CM

## 2025-09-03 DIAGNOSIS — M25.661 DECREASED RANGE OF MOTION (ROM) OF RIGHT KNEE: ICD-10-CM

## 2025-09-03 PROCEDURE — 97110 THERAPEUTIC EXERCISES: CPT | Mod: GP,CQ

## 2025-09-03 ASSESSMENT — PAIN SCALES - GENERAL: PAINLEVEL_OUTOF10: 0 - NO PAIN

## 2025-09-03 ASSESSMENT — PAIN - FUNCTIONAL ASSESSMENT: PAIN_FUNCTIONAL_ASSESSMENT: 0-10

## (undated) DEVICE — SHAVER, SABRETOOTH, CURVED, 4.0MM X 13CM

## (undated) DEVICE — Device

## (undated) DEVICE — TIP, SUCTION, YANKAUER, FLEXIBLE

## (undated) DEVICE — GLOVES, SURG BIOGEL, SZ-8.5, PF, LF

## (undated) DEVICE — BLANKET, LOWER BODY, VHA PLUS, ADULT

## (undated) DEVICE — GLOVE, SURGICAL, PROTEXIS PI BLUE W/NEUTHERA, 7.5, PF, LF

## (undated) DEVICE — PROBE, APOLLO RF, 50 DEG, MULTI PORT

## (undated) DEVICE — STRIP, SKIN CLOSURE, STERI STRIP, REINFORCED, 0.5 X 4 IN

## (undated) DEVICE — DRESSING, GAUZE, PETROLATUM, XEROFORM, 4 X 4, PEELABLE FOIL

## (undated) DEVICE — SUTURE, VICRYL, 0, 27 IN, CT-2, UNDYED

## (undated) DEVICE — SUTURE, MONOCRYL, 3-0, 27 IN, PS-2, UNDYED

## (undated) DEVICE — NEEDLE, SPINAL, S/SU, 18GA 3IN, QUINCKE, STERILE

## (undated) DEVICE — CUTTER, BONE, 5.5 X 13

## (undated) DEVICE — TUBING, DUAL WAVE, OUTFLOW

## (undated) DEVICE — SUTURE, CTD, VICRYL, 2-0, UND, BR, CT-2

## (undated) DEVICE — SUTURE, ETHILON, 3-0, 18 IN, PS1, BLACK

## (undated) DEVICE — DRESSING, ABDOMINAL, TENDERSORB, 8 X 7-1/2 IN, STERILE

## (undated) DEVICE — PIN, PASSING 2.4 FLEXIBLE

## (undated) DEVICE — ADULT REM POLYHESIVE II PATIENT RETURN ELECTRODE W/9 FT (2.7 M) ATTACHED CORD

## (undated) DEVICE — BANDAGE, ESMARK, 6 IN X 12 FT

## (undated) DEVICE — DRAPE, SHEET, 17 X 23 IN

## (undated) DEVICE — ENDOBUTTON, ACL CL PAC

## (undated) DEVICE — PADDING, UNDERCAST, WEBRIL, 6 IN X 4 YD, REG, NS

## (undated) DEVICE — GLOVE, SURGICAL, PROTEXIS PI , 7.0, PF, LF

## (undated) DEVICE — GLOVE, SURGICAL, PROTEXIS PI , 8.0, PF, LF

## (undated) DEVICE — TUBING, PATIENT 8FT STERILE

## (undated) DEVICE — BLADE, OSCILLATING/SAGITTAL, 25MM X 9MM

## (undated) DEVICE — TUBING, SUCTION, 6MM X 10, CLEAN N-COND

## (undated) DEVICE — CONTAINER STERILE SPECIMEN 90ML, STERILE

## (undated) DEVICE — SPONGE, GAUZE, AVANT, STERILE, NONWOVEN, 4PLY, 4 X 4, STANDARD

## (undated) DEVICE — GOWN, ECLIPSE, PREVENTION PLUS,  XXLARGE, XLONG

## (undated) DEVICE — PENCIL, ELECTROSURG, W/BUTTON SWITCH & HOLSTER, EZ CLEAN, DISP

## (undated) DEVICE — BANDAGE, COFLEX, 6 X 5 YDS, TAN, STERILE, LF

## (undated) DEVICE — GOWN, ASTOUND, XL

## (undated) DEVICE — COVER, TABLE, 44X90

## (undated) DEVICE — SYRINGE, 60 CC, IRRIGATION, BULB, CONTRO-BULB, PAPER POUCH